# Patient Record
Sex: MALE | Race: WHITE | NOT HISPANIC OR LATINO | URBAN - METROPOLITAN AREA
[De-identification: names, ages, dates, MRNs, and addresses within clinical notes are randomized per-mention and may not be internally consistent; named-entity substitution may affect disease eponyms.]

---

## 2017-02-08 ENCOUNTER — OUTPATIENT (OUTPATIENT)
Dept: OUTPATIENT SERVICES | Facility: HOSPITAL | Age: 62
LOS: 1 days | Discharge: HOME | End: 2017-02-08

## 2017-02-08 ENCOUNTER — APPOINTMENT (OUTPATIENT)
Dept: SURGERY | Facility: CLINIC | Age: 62
End: 2017-02-08

## 2017-02-08 VITALS
HEIGHT: 74 IN | BODY MASS INDEX: 35.29 KG/M2 | DIASTOLIC BLOOD PRESSURE: 95 MMHG | TEMPERATURE: 97.7 F | WEIGHT: 275 LBS | OXYGEN SATURATION: 97 % | HEART RATE: 72 BPM | RESPIRATION RATE: 16 BRPM | SYSTOLIC BLOOD PRESSURE: 150 MMHG

## 2017-02-08 VITALS
HEIGHT: 74 IN | DIASTOLIC BLOOD PRESSURE: 87 MMHG | SYSTOLIC BLOOD PRESSURE: 144 MMHG | OXYGEN SATURATION: 99 % | RESPIRATION RATE: 18 BRPM | TEMPERATURE: 98 F | WEIGHT: 287.92 LBS

## 2017-02-08 DIAGNOSIS — Z98.89 OTHER SPECIFIED POSTPROCEDURAL STATES: Chronic | ICD-10-CM

## 2017-02-08 DIAGNOSIS — Z82.49 FAMILY HISTORY OF ISCHEMIC HEART DISEASE AND OTHER DISEASES OF THE CIRCULATORY SYSTEM: ICD-10-CM

## 2017-02-08 DIAGNOSIS — Z78.9 OTHER SPECIFIED HEALTH STATUS: ICD-10-CM

## 2017-02-08 DIAGNOSIS — K46.9 UNSPECIFIED ABDOMINAL HERNIA WITHOUT OBSTRUCTION OR GANGRENE: ICD-10-CM

## 2017-02-08 DIAGNOSIS — Z01.818 ENCOUNTER FOR OTHER PREPROCEDURAL EXAMINATION: ICD-10-CM

## 2017-02-08 LAB
ANION GAP SERPL CALC-SCNC: 8 MMOL/L — SIGNIFICANT CHANGE UP (ref 5–17)
BASOPHILS # BLD AUTO: 0 K/UL — SIGNIFICANT CHANGE UP (ref 0–0.2)
BASOPHILS NFR BLD AUTO: 0.8 % — SIGNIFICANT CHANGE UP (ref 0–2)
BUN SERPL-MCNC: 13 MG/DL — SIGNIFICANT CHANGE UP (ref 7–23)
CALCIUM SERPL-MCNC: 8.9 MG/DL — SIGNIFICANT CHANGE UP (ref 8.5–10.1)
CHLORIDE SERPL-SCNC: 107 MMOL/L — SIGNIFICANT CHANGE UP (ref 96–108)
CO2 SERPL-SCNC: 28 MMOL/L — SIGNIFICANT CHANGE UP (ref 22–31)
CREAT SERPL-MCNC: 1.06 MG/DL — SIGNIFICANT CHANGE UP (ref 0.5–1.3)
EOSINOPHIL # BLD AUTO: 0.2 K/UL — SIGNIFICANT CHANGE UP (ref 0–0.5)
EOSINOPHIL NFR BLD AUTO: 3.1 % — SIGNIFICANT CHANGE UP (ref 0–6)
GLUCOSE SERPL-MCNC: 100 MG/DL — HIGH (ref 70–99)
HCT VFR BLD CALC: 45.7 % — SIGNIFICANT CHANGE UP (ref 39–50)
HGB BLD-MCNC: 15.6 G/DL — SIGNIFICANT CHANGE UP (ref 13–17)
INR BLD: 1.1 RATIO — SIGNIFICANT CHANGE UP (ref 0.88–1.16)
LYMPHOCYTES # BLD AUTO: 1.3 K/UL — SIGNIFICANT CHANGE UP (ref 1–3.3)
LYMPHOCYTES # BLD AUTO: 21.9 % — SIGNIFICANT CHANGE UP (ref 13–44)
MCHC RBC-ENTMCNC: 29.3 PG — SIGNIFICANT CHANGE UP (ref 27–34)
MCHC RBC-ENTMCNC: 34.1 GM/DL — SIGNIFICANT CHANGE UP (ref 32–36)
MCV RBC AUTO: 85.9 FL — SIGNIFICANT CHANGE UP (ref 80–100)
MONOCYTES # BLD AUTO: 0.4 K/UL — SIGNIFICANT CHANGE UP (ref 0–0.9)
MONOCYTES NFR BLD AUTO: 7 % — SIGNIFICANT CHANGE UP (ref 2–14)
NEUTROPHILS # BLD AUTO: 3.9 K/UL — SIGNIFICANT CHANGE UP (ref 1.8–7.4)
NEUTROPHILS NFR BLD AUTO: 67.2 % — SIGNIFICANT CHANGE UP (ref 43–77)
PLATELET # BLD AUTO: 174 K/UL — SIGNIFICANT CHANGE UP (ref 150–400)
POTASSIUM SERPL-MCNC: 4 MMOL/L — SIGNIFICANT CHANGE UP (ref 3.5–5.3)
POTASSIUM SERPL-SCNC: 4 MMOL/L — SIGNIFICANT CHANGE UP (ref 3.5–5.3)
PROTHROM AB SERPL-ACNC: 12.3 SEC — SIGNIFICANT CHANGE UP (ref 10–13.1)
RBC # BLD: 5.32 M/UL — SIGNIFICANT CHANGE UP (ref 4.2–5.8)
RBC # FLD: 12.7 % — SIGNIFICANT CHANGE UP (ref 11–15)
SODIUM SERPL-SCNC: 143 MMOL/L — SIGNIFICANT CHANGE UP (ref 135–145)
WBC # BLD: 5.8 K/UL — SIGNIFICANT CHANGE UP (ref 3.8–10.5)
WBC # FLD AUTO: 5.8 K/UL — SIGNIFICANT CHANGE UP (ref 3.8–10.5)

## 2017-02-08 NOTE — H&P PST ADULT - NSANTHOSAYNRD_GEN_A_CORE
No. DELFINO screening performed.  STOP BANG Legend: 0-2 = LOW Risk; 3-4 = INTERMEDIATE Risk; 5-8 = HIGH Risk

## 2017-02-10 LAB
MRSA SPEC QL CULT: NOT DETECTED
STAPH AUREUS (SA): NOT DETECTED

## 2017-02-10 RX ORDER — CELECOXIB 200 MG/1
400 CAPSULE ORAL ONCE
Qty: 0 | Refills: 0 | Status: COMPLETED | OUTPATIENT
Start: 2017-02-13 | End: 2017-02-13

## 2017-02-10 RX ORDER — SODIUM CHLORIDE 9 MG/ML
3 INJECTION INTRAMUSCULAR; INTRAVENOUS; SUBCUTANEOUS EVERY 8 HOURS
Qty: 0 | Refills: 0 | Status: DISCONTINUED | OUTPATIENT
Start: 2017-02-13 | End: 2017-02-13

## 2017-02-12 ENCOUNTER — RESULT REVIEW (OUTPATIENT)
Age: 62
End: 2017-02-12

## 2017-02-13 ENCOUNTER — TRANSCRIPTION ENCOUNTER (OUTPATIENT)
Age: 62
End: 2017-02-13

## 2017-02-13 ENCOUNTER — APPOINTMENT (OUTPATIENT)
Dept: SURGERY | Facility: HOSPITAL | Age: 62
End: 2017-02-13

## 2017-02-13 ENCOUNTER — OUTPATIENT (OUTPATIENT)
Dept: OUTPATIENT SERVICES | Facility: HOSPITAL | Age: 62
LOS: 1 days | Discharge: HOME | End: 2017-02-13
Payer: COMMERCIAL

## 2017-02-13 VITALS
HEART RATE: 87 BPM | OXYGEN SATURATION: 97 % | TEMPERATURE: 98 F | DIASTOLIC BLOOD PRESSURE: 73 MMHG | RESPIRATION RATE: 17 BRPM | SYSTOLIC BLOOD PRESSURE: 120 MMHG

## 2017-02-13 VITALS
SYSTOLIC BLOOD PRESSURE: 121 MMHG | RESPIRATION RATE: 18 BRPM | OXYGEN SATURATION: 98 % | TEMPERATURE: 99 F | WEIGHT: 279.99 LBS | HEART RATE: 80 BPM | HEIGHT: 74 IN | DIASTOLIC BLOOD PRESSURE: 88 MMHG

## 2017-02-13 DIAGNOSIS — Z98.89 OTHER SPECIFIED POSTPROCEDURAL STATES: Chronic | ICD-10-CM

## 2017-02-13 DIAGNOSIS — M23.90 UNSPECIFIED INTERNAL DERANGEMENT OF UNSPECIFIED KNEE: Chronic | ICD-10-CM

## 2017-02-13 PROCEDURE — 88302 TISSUE EXAM BY PATHOLOGIST: CPT | Mod: 26

## 2017-02-13 PROCEDURE — 49585: CPT | Mod: AS

## 2017-02-13 PROCEDURE — 88304 TISSUE EXAM BY PATHOLOGIST: CPT | Mod: 26

## 2017-02-13 RX ORDER — ACETAMINOPHEN 500 MG
1000 TABLET ORAL ONCE
Qty: 0 | Refills: 0 | Status: COMPLETED | OUTPATIENT
Start: 2017-02-13 | End: 2017-02-13

## 2017-02-13 RX ORDER — SODIUM CHLORIDE 9 MG/ML
1000 INJECTION, SOLUTION INTRAVENOUS
Qty: 0 | Refills: 0 | Status: DISCONTINUED | OUTPATIENT
Start: 2017-02-13 | End: 2017-02-13

## 2017-02-13 RX ORDER — GABAPENTIN 400 MG/1
600 CAPSULE ORAL ONCE
Qty: 0 | Refills: 0 | Status: COMPLETED | OUTPATIENT
Start: 2017-02-13 | End: 2017-02-13

## 2017-02-13 RX ORDER — MORPHINE SULFATE 50 MG/1
4 CAPSULE, EXTENDED RELEASE ORAL
Qty: 0 | Refills: 0 | Status: DISCONTINUED | OUTPATIENT
Start: 2017-02-13 | End: 2017-02-13

## 2017-02-13 RX ORDER — ONDANSETRON 8 MG/1
4 TABLET, FILM COATED ORAL ONCE
Qty: 0 | Refills: 0 | Status: DISCONTINUED | OUTPATIENT
Start: 2017-02-13 | End: 2017-02-13

## 2017-02-13 RX ORDER — GABAPENTIN 400 MG/1
600 CAPSULE ORAL ONCE
Qty: 0 | Refills: 0 | Status: DISCONTINUED | OUTPATIENT
Start: 2017-02-13 | End: 2017-02-13

## 2017-02-13 RX ADMIN — Medication 400 MILLIGRAM(S): at 11:04

## 2017-02-13 RX ADMIN — CELECOXIB 400 MILLIGRAM(S): 200 CAPSULE ORAL at 07:33

## 2017-02-13 RX ADMIN — SODIUM CHLORIDE 3 MILLILITER(S): 9 INJECTION INTRAMUSCULAR; INTRAVENOUS; SUBCUTANEOUS at 07:14

## 2017-02-13 RX ADMIN — Medication 1000 MILLIGRAM(S): at 11:19

## 2017-02-13 RX ADMIN — SODIUM CHLORIDE 40 MILLILITER(S): 9 INJECTION, SOLUTION INTRAVENOUS at 11:05

## 2017-02-13 RX ADMIN — GABAPENTIN 600 MILLIGRAM(S): 400 CAPSULE ORAL at 07:33

## 2017-02-13 NOTE — ASU DISCHARGE PLAN (ADULT/PEDIATRIC). - MEDICATION SUMMARY - MEDICATIONS TO TAKE
I will START or STAY ON the medications listed below when I get home from the hospital:    Percocet 5/325 oral tablet  -- 2 tab(s) by mouth every 6 hours MDD:6  -- Indication: For postoperative pain

## 2017-02-13 NOTE — ASU DISCHARGE PLAN (ADULT/PEDIATRIC). - SPECIAL INSTRUCTIONS
Drink plenty of fluids to prevent constipation and fruit juices without pulp that are high in vitamin C. Rest as needed. Do not lift anything heavier than 10 pounds. You may take motrin or advil for mild pain as needed, in addition to prescribed narcotic medication. You may take over the counter stool softeners as needed. Call for any fever over 101, nausea, vomiting, severe pain, no passing of gas or bowel movement. In 48 hours, you may shower and remove pink dressing from abdomen. Please pat dry abdomen/arm. Leave the white steri strips in place; they will fall off in 5-7 days.

## 2017-02-13 NOTE — BRIEF OPERATIVE NOTE - PRE-OP DX
Lipoma of left upper extremity  02/13/2017    Active  Hailee Gudino  Umbilical hernia, incarcerated  02/13/2017    Active  Hailee Gudino

## 2017-02-13 NOTE — BRIEF OPERATIVE NOTE - PROCEDURE
Lipoma excision  02/13/2017  x2, LUE  Active  CFORNUTO  Repair of umbilical hernia with mesh  02/13/2017  incarcerated  Active  CFORNUTO

## 2017-02-13 NOTE — ASU DISCHARGE PLAN (ADULT/PEDIATRIC). - NOTIFY
Bleeding that does not stop/Pain not relieved by Medications/Swelling that continues/Fever greater than 101/Persistent Nausea and Vomiting

## 2017-02-14 LAB — SURGICAL PATHOLOGY FINAL REPORT - CH: SIGNIFICANT CHANGE UP

## 2017-02-16 DIAGNOSIS — K42.9 UMBILICAL HERNIA WITHOUT OBSTRUCTION OR GANGRENE: ICD-10-CM

## 2017-02-16 DIAGNOSIS — G47.33 OBSTRUCTIVE SLEEP APNEA (ADULT) (PEDIATRIC): ICD-10-CM

## 2017-02-16 DIAGNOSIS — D17.22 BENIGN LIPOMATOUS NEOPLASM OF SKIN AND SUBCUTANEOUS TISSUE OF LEFT ARM: ICD-10-CM

## 2017-02-22 ENCOUNTER — APPOINTMENT (OUTPATIENT)
Dept: SURGERY | Facility: CLINIC | Age: 62
End: 2017-02-22

## 2017-02-22 VITALS
HEART RATE: 90 BPM | SYSTOLIC BLOOD PRESSURE: 160 MMHG | DIASTOLIC BLOOD PRESSURE: 90 MMHG | OXYGEN SATURATION: 97 % | TEMPERATURE: 98.5 F | RESPIRATION RATE: 16 BRPM

## 2017-02-22 DIAGNOSIS — K42.9 UMBILICAL HERNIA W/OUT OBSTRUCTION OR GANGRENE: ICD-10-CM

## 2017-02-22 DIAGNOSIS — Z98.890 OTHER SPECIFIED POSTPROCEDURAL STATES: ICD-10-CM

## 2017-02-22 DIAGNOSIS — Z86.018 OTHER SPECIFIED POSTPROCEDURAL STATES: ICD-10-CM

## 2019-09-23 PROBLEM — E66.9 OBESITY, UNSPECIFIED: Chronic | Status: ACTIVE | Noted: 2017-02-08

## 2019-09-27 ENCOUNTER — APPOINTMENT (OUTPATIENT)
Dept: RHEUMATOLOGY | Facility: CLINIC | Age: 64
End: 2019-09-27

## 2022-03-03 ENCOUNTER — NON-APPOINTMENT (OUTPATIENT)
Age: 67
End: 2022-03-03

## 2022-03-03 ENCOUNTER — APPOINTMENT (OUTPATIENT)
Dept: FAMILY MEDICINE | Facility: CLINIC | Age: 67
End: 2022-03-03
Payer: MEDICARE

## 2022-03-03 VITALS
DIASTOLIC BLOOD PRESSURE: 70 MMHG | SYSTOLIC BLOOD PRESSURE: 123 MMHG | WEIGHT: 310 LBS | HEART RATE: 86 BPM | RESPIRATION RATE: 16 BRPM | BODY MASS INDEX: 39.78 KG/M2 | HEIGHT: 74 IN

## 2022-03-03 DIAGNOSIS — Z13.29 ENCOUNTER FOR SCREENING FOR OTHER SUSPECTED ENDOCRINE DISORDER: ICD-10-CM

## 2022-03-03 DIAGNOSIS — Z12.5 ENCOUNTER FOR SCREENING FOR MALIGNANT NEOPLASM OF PROSTATE: ICD-10-CM

## 2022-03-03 DIAGNOSIS — N50.89 OTHER SPECIFIED DISORDERS OF THE MALE GENITAL ORGANS: ICD-10-CM

## 2022-03-03 DIAGNOSIS — Z00.00 ENCOUNTER FOR GENERAL ADULT MEDICAL EXAMINATION W/OUT ABNORMAL FINDINGS: ICD-10-CM

## 2022-03-03 DIAGNOSIS — Z13.31 ENCOUNTER FOR SCREENING FOR DEPRESSION: ICD-10-CM

## 2022-03-03 DIAGNOSIS — Z12.11 ENCOUNTER FOR SCREENING FOR MALIGNANT NEOPLASM OF COLON: ICD-10-CM

## 2022-03-03 DIAGNOSIS — Z11.59 ENCOUNTER FOR SCREENING FOR OTHER VIRAL DISEASES: ICD-10-CM

## 2022-03-03 DIAGNOSIS — Z11.4 ENCOUNTER FOR SCREENING FOR HUMAN IMMUNODEFICIENCY VIRUS [HIV]: ICD-10-CM

## 2022-03-03 PROCEDURE — 36415 COLL VENOUS BLD VENIPUNCTURE: CPT

## 2022-03-03 PROCEDURE — G0402 INITIAL PREVENTIVE EXAM: CPT

## 2022-03-03 PROCEDURE — 99203 OFFICE O/P NEW LOW 30 MIN: CPT | Mod: 25

## 2022-03-03 PROCEDURE — G0403: CPT

## 2022-03-03 NOTE — PHYSICAL EXAM
[No Acute Distress] : no acute distress [Well Nourished] : well nourished [Well Developed] : well developed [Well-Appearing] : well-appearing [Normal Voice/Communication] : normal voice/communication [Normal Sclera/Conjunctiva] : normal sclera/conjunctiva [PERRL] : pupils equal round and reactive to light [EOMI] : extraocular movements intact [Normal Outer Ear/Nose] : the outer ears and nose were normal in appearance [Normal Oropharynx] : the oropharynx was normal [No JVD] : no jugular venous distention [No Lymphadenopathy] : no lymphadenopathy [Supple] : supple [Thyroid Normal, No Nodules] : the thyroid was normal and there were no nodules present [No Respiratory Distress] : no respiratory distress  [No Accessory Muscle Use] : no accessory muscle use [Clear to Auscultation] : lungs were clear to auscultation bilaterally [Normal Rate] : normal rate  [Regular Rhythm] : with a regular rhythm [Normal S1, S2] : normal S1 and S2 [No Murmur] : no murmur heard [No Carotid Bruits] : no carotid bruits [No Abdominal Bruit] : a ~M bruit was not heard ~T in the abdomen [No Varicosities] : no varicosities [Pedal Pulses Present] : the pedal pulses are present [No Edema] : there was no peripheral edema [No Palpable Aorta] : no palpable aorta [No Extremity Clubbing/Cyanosis] : no extremity clubbing/cyanosis [Soft] : abdomen soft [Non Tender] : non-tender [Non-distended] : non-distended [No Masses] : no abdominal mass palpated [No HSM] : no HSM [Normal Bowel Sounds] : normal bowel sounds [Urethral Meatus] : meatus normal [Urinary Bladder Findings] : the bladder was normal on palpation [Testes Tenderness] : no tenderness of the testes [Testes Mass (___cm)] : there were no testicular masses [Normal Posterior Cervical Nodes] : no posterior cervical lymphadenopathy [Normal Anterior Cervical Nodes] : no anterior cervical lymphadenopathy [No CVA Tenderness] : no CVA  tenderness [No Spinal Tenderness] : no spinal tenderness [No Joint Swelling] : no joint swelling [Grossly Normal Strength/Tone] : grossly normal strength/tone [No Rash] : no rash [Coordination Grossly Intact] : coordination grossly intact [No Focal Deficits] : no focal deficits [Normal Gait] : normal gait [Deep Tendon Reflexes (DTR)] : deep tendon reflexes were 2+ and symmetric [Speech Grossly Normal] : speech grossly normal [Normal Affect] : the affect was normal [Normal Mood] : the mood was normal [Normal Insight/Judgement] : insight and judgment were intact [FreeTextEntry1] : mild increased soft tissues in both testicles, no mass palpated. no tenderness

## 2022-03-03 NOTE — HISTORY OF PRESENT ILLNESS
[FreeTextEntry1] : establish care [de-identified] : Mr. AYLA BARONE is a pleasant 66 year old male with PMH of HLD, HTN, morbid obesity, fatty liver on RUQ Us from 12/30/2021, DELFINO,  who comes in to the office for med refill and CPE. \par He had a lung GT with hilar adenopathy, non specific on 02/24/2022. The recommendation was to repeat 3 months later\par No complaints today\par \par GI: [facility], but moved

## 2022-03-03 NOTE — HEALTH RISK ASSESSMENT
[Never] : Never [Yes] : Yes [2 - 4 times a month (2 pts)] : 2-4 times a month (2 points) [1 or 2 (0 pts)] : 1 or 2 (0 points) [Never (0 pts)] : Never (0 points) [No falls in past year] : Patient reported no falls in the past year [0] : 2) Feeling down, depressed, or hopeless: Not at all (0) [PHQ-2 Negative - No further assessment needed] : PHQ-2 Negative - No further assessment needed [Patient reported colonoscopy was normal] : Patient reported colonoscopy was normal [HIV Test offered] : HIV Test offered [Hepatitis C test offered] : Hepatitis C test offered [Audit-CScore] : 2 [BOC5Elvch] : 0 [ColonoscopyDate] : 01/2014

## 2022-03-03 NOTE — PLAN
[FreeTextEntry1] : \par In regards annual physical exam: \par Will need vaccinations records\par Ordering CBC, CMP, Hep C, HIV\par Colon cancer screen ordered: iFOBT\par Depression screen: PHQ-2 test done, < 2 as noted above\par AUDIT-C test done, negative as noted above\par Advised to see optometrist once at year and dentist every 6 months \par \par Diabetes Mellitus\par Encouraged to avoid high carbohydrates diet, to do exercise and to keep appointments\par Checking a HBA1C\par Checking a microalb/creat ratio\par Referring to podiatry\par Sees ophthalmologist Dr Boone ([place])\par Continue metformin 500 mg BID\par \par HLD, morbid obesity\par Checking lipid panel, TSH\par Continue atorvastatin 20 mg QHS\par Referring to weight medicine\par \par In regards to hypertension\par Patient's blood pressure in adequate range. \par Avoid salt\par Continue Irbesartan 150 mg QD\par Abnormal EKG reviewed: non specific  T abnormalities but no ST elevations, patient without cardiac symptoms. Referring to cardio\par \par Bilateral testicles swelling\par Asymptomatic\par Could be hydrocele or varicocele but would need to rule out other causes\par Ordering a testicular US\par Advised to wear support underwear\par Checking a UA, PSA\par \par Return to care: within 3 months for follow up or earlier if needed\par Call or return for any questions \par

## 2022-03-04 ENCOUNTER — NON-APPOINTMENT (OUTPATIENT)
Age: 67
End: 2022-03-04

## 2022-03-04 LAB
ALBUMIN SERPL ELPH-MCNC: 4.6 G/DL
ALP BLD-CCNC: 69 U/L
ALT SERPL-CCNC: 149 U/L
ANION GAP SERPL CALC-SCNC: 13 MMOL/L
AST SERPL-CCNC: 107 U/L
BASOPHILS # BLD AUTO: 0.05 K/UL
BASOPHILS NFR BLD AUTO: 0.9 %
BILIRUB SERPL-MCNC: 0.7 MG/DL
BUN SERPL-MCNC: 18 MG/DL
CALCIUM SERPL-MCNC: 9.5 MG/DL
CHLORIDE SERPL-SCNC: 99 MMOL/L
CHOLEST SERPL-MCNC: 180 MG/DL
CO2 SERPL-SCNC: 26 MMOL/L
CREAT SERPL-MCNC: 1.03 MG/DL
CREAT SPEC-SCNC: 117 MG/DL
EGFR: 81 ML/MIN/1.73M2
EOSINOPHIL # BLD AUTO: 0.12 K/UL
EOSINOPHIL NFR BLD AUTO: 2.2 %
ESTIMATED AVERAGE GLUCOSE: 249 MG/DL
GLUCOSE SERPL-MCNC: 239 MG/DL
HBA1C MFR BLD HPLC: 10.3 %
HCT VFR BLD CALC: 48.1 %
HCV AB SER QL: NONREACTIVE
HCV S/CO RATIO: 0.1 S/CO
HDLC SERPL-MCNC: 36 MG/DL
HGB BLD-MCNC: 15.2 G/DL
HIV1+2 AB SPEC QL IA.RAPID: NONREACTIVE
IMM GRANULOCYTES NFR BLD AUTO: 0.4 %
LDLC SERPL CALC-MCNC: 119 MG/DL
LYMPHOCYTES # BLD AUTO: 1.13 K/UL
LYMPHOCYTES NFR BLD AUTO: 20.5 %
MAN DIFF?: NORMAL
MCHC RBC-ENTMCNC: 28.8 PG
MCHC RBC-ENTMCNC: 31.6 GM/DL
MCV RBC AUTO: 91.1 FL
MICROALBUMIN 24H UR DL<=1MG/L-MCNC: 2.9 MG/DL
MICROALBUMIN/CREAT 24H UR-RTO: 25 MG/G
MONOCYTES # BLD AUTO: 0.48 K/UL
MONOCYTES NFR BLD AUTO: 8.7 %
NEUTROPHILS # BLD AUTO: 3.7 K/UL
NEUTROPHILS NFR BLD AUTO: 67.3 %
NONHDLC SERPL-MCNC: 144 MG/DL
PLATELET # BLD AUTO: 183 K/UL
POTASSIUM SERPL-SCNC: 4.4 MMOL/L
PROT SERPL-MCNC: 7.2 G/DL
RBC # BLD: 5.28 M/UL
RBC # FLD: 13.5 %
SODIUM SERPL-SCNC: 138 MMOL/L
TRIGL SERPL-MCNC: 123 MG/DL
TSH SERPL-ACNC: 1.98 UIU/ML
WBC # FLD AUTO: 5.5 K/UL

## 2022-03-07 LAB
ALT SERPL-CCNC: 140 U/L
APPEARANCE: ABNORMAL
AST SERPL-CCNC: 111 U/L
BACTERIA: NEGATIVE
BILIRUBIN URINE: NEGATIVE
BLOOD URINE: NEGATIVE
COLOR: YELLOW
GLUCOSE QUALITATIVE U: ABNORMAL
HAV IGM SER QL: NONREACTIVE
HBV CORE IGM SER QL: NONREACTIVE
HBV SURFACE AG SER QL: NONREACTIVE
HCV AB SER QL: NONREACTIVE
HCV S/CO RATIO: 0.1 S/CO
HYALINE CASTS: 0 /LPF
KETONES URINE: NEGATIVE
LEUKOCYTE ESTERASE URINE: NEGATIVE
MICROSCOPIC-UA: NORMAL
NITRITE URINE: NEGATIVE
PH URINE: 5
PROTEIN URINE: NEGATIVE
PSA FREE FLD-MCNC: 40 %
PSA FREE SERPL-MCNC: 0.28 NG/ML
PSA SERPL-MCNC: 0.69 NG/ML
RED BLOOD CELLS URINE: 1 /HPF
SPECIFIC GRAVITY URINE: 1.02
SQUAMOUS EPITHELIAL CELLS: 1 /HPF
URIC ACID CRYSTALS: ABNORMAL
UROBILINOGEN URINE: NORMAL
WHITE BLOOD CELLS URINE: 0 /HPF

## 2022-03-25 ENCOUNTER — APPOINTMENT (OUTPATIENT)
Dept: BARIATRICS/WEIGHT MGMT | Facility: CLINIC | Age: 67
End: 2022-03-25
Payer: MEDICARE

## 2022-03-25 VITALS — BODY MASS INDEX: 39.78 KG/M2 | WEIGHT: 310 LBS | HEIGHT: 74 IN

## 2022-03-25 PROCEDURE — 99204 OFFICE O/P NEW MOD 45 MIN: CPT | Mod: 95

## 2022-03-25 RX ORDER — IRBESARTAN 150 MG/1
150 TABLET ORAL
Refills: 0 | Status: DISCONTINUED | COMMUNITY
End: 2022-03-25

## 2022-03-25 NOTE — REASON FOR VISIT
[Initial Consultation] : an initial consultation for [FreeTextEntry2] : Here for help with wt loss, and improve health.

## 2022-03-25 NOTE — ASSESSMENT
[FreeTextEntry1] : 66 year old gentleman with  a hx of uncontrolled  dm with a a1c of 10.3 , who needs to improve his diet  interested in losing . His recent labs also revealed elevated LFT ( chronic but worse)  and likely secondary to transaminitis and fatly liver \par 1 limit red meat to once per week \par 2 stop all the whites, complex carbs only and only 2x day \par 3 no eating after 8 pm preferablly 7\par 4 exercising , with a goal of 150 min week and 60 of wts \par 5. discuss with PMD about changing meds kody the metoprolol\par 6.  meet with the nutritionist \par 80 min

## 2022-03-25 NOTE — REVIEW OF SYSTEMS
[Negative] : Psychiatric [MED-ROS-Eyes-FT] : glasses for reading  [MED-ROS-Genituro-FT] : nocturia x 3  [MED-LANIE-Gary-FT] : right knee pain secondary to need for TKA

## 2022-03-25 NOTE — HISTORY OF PRESENT ILLNESS
[Improved Health] : Improved health [Quality of Life] : Quality of life [Improved Mobility] : Improved mobility [Improved Looks/Aesthetics] : Improved looks/aesthetics [Improve Mood] : Improved mood [Decrease Medications] : Decrease medications [Improve Life Expectancy] : Improve life expectancy [Young Adult] : yound adult [Cut/Track Calories] : cut/track calories [Poor eating habits] : poor eating habits [Cravings] : cravings [Motivation] : motivation [Unsure what to eat] : unsure what to eat [Portions/overeating] : portions/overeating [Medical conditions] : medical conditions [Medications] : medications [I usually sleep 4-6 hours] : I usually sleep 4-6 hours [I snore] : I snore [Lunch] : lunch [Dinner] : dinner [Evening] : evening [Salty] : salty [Sweet] : sweet [Baked goods] : baked goods [1-2] : Dairy: 1-2 [3-5] : Meat, fish, poultry, eggs, cheese: 3-5 [Less than 1] : Sweets: less than 1 [2] : Fast food - meals per week: 2 [8] : How many cups of water do you regularly drink per day: 8 [Spouse/partner] : spouse/partner [Self] : self [Overlarge portions] : overlarge portions [Frequent Snacks] : frequent snacks [Eat Fast] : eat fast [Eat Past Satisfaction] : eat past satisfaction [Skip Meals] : skip meals [Often Go For Seconds] : often go for seconds [Boredom Eating] : boredom eating [Finish Your Plate Mentality] : finish your plate mentality [2 blocks] : Walking distance capability:2 blocks [Biking] : biking [Sports] : sports [Cardio machines: treadmill/spinning/elliptical] : cardio machines: treadmill/spinning/elliptical [0] : 0 [None] : none [T2DM] : T2DM [HTN] : HTN [DELFINO] : DELFINO [Other: ___] : [unfilled] [FreeTextEntry2] : 225 [FreeTextEntry3] : 178 [] : No [FreeTextEntry1] : 66 year old gentleman with a hx of \par 1. DM x 10 yrs\par 2 HTN\par 3 HLD\par 4 likely DELFINO \par interested in wt loss\par \par Breakfast\par gen does not like\par Honey bunches of oats\par no fruit \par \par or egg\par \par snack \par none \par \par Lunch \par tuna salad on one slice of bread 1 can with crow\par lettuce \par pickles \par \par snack \par none \par \par Dinner 6 pm \par steak flank with sauce 4 oz\par cauliflower rice\par greek salad : feta cheese and veg and dressing \par dessert: sugar free jello \par \par snack 10\par banana\par no exer\par needs a TKA on the right\par sleep  at best 5 hours, with nocturia x 3 and has an appointment for DELFINO studies \par

## 2022-03-30 ENCOUNTER — APPOINTMENT (OUTPATIENT)
Dept: FAMILY MEDICINE | Facility: CLINIC | Age: 67
End: 2022-03-30
Payer: MEDICARE

## 2022-03-30 ENCOUNTER — APPOINTMENT (OUTPATIENT)
Dept: OTOLARYNGOLOGY | Facility: CLINIC | Age: 67
End: 2022-03-30

## 2022-03-30 VITALS
RESPIRATION RATE: 16 BRPM | HEART RATE: 97 BPM | SYSTOLIC BLOOD PRESSURE: 148 MMHG | HEIGHT: 74 IN | DIASTOLIC BLOOD PRESSURE: 80 MMHG | WEIGHT: 130 LBS | BODY MASS INDEX: 16.68 KG/M2

## 2022-03-30 PROCEDURE — 36415 COLL VENOUS BLD VENIPUNCTURE: CPT

## 2022-03-30 PROCEDURE — 99214 OFFICE O/P EST MOD 30 MIN: CPT | Mod: 25

## 2022-03-30 NOTE — HISTORY OF PRESENT ILLNESS
[FreeTextEntry1] : follow up [de-identified] : Mr. AYLA BARONE is a pleasant 66 year old male with PMH of HLD, HTN, morbid obesity, fatty liver on RUQ Us from 12/30/2021, DELFINO, who comes in to the office for hospital follow up. Patient was at [facility] for dizziness on 03/15/2022, his medications were changed: his irbesartan/HCTZ was changed to metoprolol/furosemide, he was told that a coronary angio and was told that had 50% blockage, no intervention was done. Per hospital records in his portal, he had neg trops and CKMB, EKG on 03/16/2022 NSR and no abnormality, BNP and TSH was unremarkable, he was discharged.\par Patient felt dizzy the day after, he went to ENT and was told that had a viral inner ear infection. Has not had any issues since then.\par No complaints today\par \par Of note patient had transaminitis, his atorvastatin 20 mg was decreased to every other day, his hep panel was negative.\par He had a lung GT with hilar adenopathy, non specific on 02/24/2022. The recommendation was to repeat 3 months later\par \par Saw weight medicine Dr Chavarria recently.\par GI: [facility], but moved

## 2022-03-30 NOTE — PLAN
[FreeTextEntry1] : \par Diabetes Mellitus\par Encouraged to avoid high carbohydrates diet, to do exercise and to keep appointments\par HBA1C from 03/03/2022: 10.3%\par Negative  microalb/creat ratio on 03/03/2022\par Pending to see podiatry\par Sees ophthalmologist Dr Boone ([place])\par Increasing metformin (again), will take 1000 mg QAM and 500 mg QHS for 1 week and then 1000 mg BID\par \par HLD, morbid obesity\par Checking lipid panel, TSH\par Continue atorvastatin 20 mg every other night\par Seeing weight medicine\par \par Transaminitis\par ALT/AST: 149/107 on 03/04/2022, down to 142/85 on 03/15/2022 per hospital records, asymptomatic. Was advised to avoid alcohol, Tylenol, self medicating.  \par Repeat ALT/AST\par On atorvastatin now down to every other day. \par Hep panel from 03/07/2022: negative\par If repeat labs still abnormal would do an abd US, decrease/stop atorvastatin\par \par In regards to hypertension\par Patient's blood pressure in adequate range. \par Avoid salt\par Stop metoprolol and Furosemide\par Restarting his previous regimen: Irbesartan/HCTZ  150/12.5 mg QD for kidney protection.\par Abnormal EKG reviewed: non specific  T abnormalities but no ST elevations, patient without cardiac symptoms. \par WIll see cardio Dr Izquierdo on 04/01/2022\par \par Bilateral testicles swelling\par Asymptomatic\par I told him before that could be hydrocele or varicocele but would need to rule out other causes\par Still pending to do a testicular US\par Advised to wear support underwear\par Unremarkable UA, PSA\par \par Return to care: within 1 week for blood pressure follow up then in 3 months for follow up in medical conditions or earlier if needed\par Call or return for any questions \par

## 2022-03-31 LAB
ALT SERPL-CCNC: 136 U/L
AST SERPL-CCNC: 108 U/L
URATE SERPL-MCNC: 4.7 MG/DL

## 2022-03-31 RX ORDER — ATORVASTATIN CALCIUM 20 MG/1
20 TABLET, FILM COATED ORAL
Refills: 0 | Status: DISCONTINUED | COMMUNITY
End: 2022-03-31

## 2022-04-01 ENCOUNTER — NON-APPOINTMENT (OUTPATIENT)
Age: 67
End: 2022-04-01

## 2022-04-01 ENCOUNTER — APPOINTMENT (OUTPATIENT)
Dept: CARDIOLOGY | Facility: CLINIC | Age: 67
End: 2022-04-01
Payer: MEDICARE

## 2022-04-01 VITALS
HEIGHT: 74 IN | SYSTOLIC BLOOD PRESSURE: 144 MMHG | RESPIRATION RATE: 16 BRPM | HEART RATE: 82 BPM | DIASTOLIC BLOOD PRESSURE: 90 MMHG | OXYGEN SATURATION: 96 % | BODY MASS INDEX: 40.04 KG/M2 | WEIGHT: 312 LBS

## 2022-04-01 DIAGNOSIS — Z86.69 PERSONAL HISTORY OF OTHER DISEASES OF THE NERVOUS SYSTEM AND SENSE ORGANS: ICD-10-CM

## 2022-04-01 DIAGNOSIS — R94.31 ABNORMAL ELECTROCARDIOGRAM [ECG] [EKG]: ICD-10-CM

## 2022-04-01 DIAGNOSIS — R42 DIZZINESS AND GIDDINESS: ICD-10-CM

## 2022-04-01 DIAGNOSIS — Z82.49 FAMILY HISTORY OF ISCHEMIC HEART DISEASE AND OTHER DISEASES OF THE CIRCULATORY SYSTEM: ICD-10-CM

## 2022-04-01 DIAGNOSIS — Z86.39 PERSONAL HISTORY OF OTHER ENDOCRINE, NUTRITIONAL AND METABOLIC DISEASE: ICD-10-CM

## 2022-04-01 PROCEDURE — 93000 ELECTROCARDIOGRAM COMPLETE: CPT

## 2022-04-01 PROCEDURE — 99204 OFFICE O/P NEW MOD 45 MIN: CPT

## 2022-04-01 NOTE — HISTORY OF PRESENT ILLNESS
[FreeTextEntry1] : PAtient is a 65yo M with poorly controlled DM, HTN, HLD, DELFINO (unable to tolerate CPAP), obesity here for cardiac evaluation. Recently in hospital and had cath with non-obstructive CAD. Was in [facility] for dizziness, meds changed and had cath and told 50% blockage (will need to obtain records).  Has also been taken off statin due to transaminitis. Was feeling very dizzy after going out to dinner, went to hospital and vomited several times in the ambulance and in ED. Dizziness improved, then another episode at home that resolved after hospitalization. Slowly continued to improve and diagnosed with viral otitis media leading to symptoms. \par \par Patient has been doing well without any chest pain or shortness of breath. Patient denies PND/orthopnea/edema/palpitations/syncope/claudication. \par \par \par Works in construction\par \par \par \par ROS: GI negative, all others negative

## 2022-04-01 NOTE — DISCUSSION/SUMMARY
[FreeTextEntry1] : PAtient is a 65yo M with poorly controlled DM, HTN, HLD, DELFINO (unable to tolerate CPAP), fatty liver, mild non-obstructive CAD, obesity here for cardiac evaluation\par -Recent cath with 50% distal LAD and diagonal disease. MEd management. No symptoms either\par -Echo with mild LVH, needs BP control, exercise and weight loss\par -Transaminitis maybe from fatty liver. REcheck with Dr Craig then recommend restarting statin\par \par \par 1. As above restart statin as long as stable LFTs , to be checked again with DR Craig  \par 2. Diabetes management per PMD. Counselled on diet/weight loss \par 3. Recommend aggressive diet and lifestyle modifications\par 4. Recommend 30 minutes moderate intensity aerobic activity 5 days per week \par 5. PAtient has not started BP med yet, will start then re-evaluate BP as above goal now\par 6. Continue medical management of CAD (non-obstructive)\par 7. Will arrange carotid duplex due to dizziness, DM, HTN, obesity, HLD\par 8. Call with test results, otherwise follow up 6 months

## 2022-04-01 NOTE — ASSESSMENT
[FreeTextEntry1] : ECG: SR, NSST, low voltage\par \par  HDL 36   (3/2022)\par A1C  10.3\par \par ECHO 3/2022 ([facility]) :\par 1. Mild LVH, EF 60-65%\par 2. Normal RV/LA/RA \par 3. No clinically significant valvular disease  \par \par LHC 3/2022([facility])\par 1. Mild diffuse disease\par 2. 50% distal LAD stenosis and 50% diagonal stenosis\par

## 2022-04-11 ENCOUNTER — APPOINTMENT (OUTPATIENT)
Dept: BARIATRICS/WEIGHT MGMT | Facility: CLINIC | Age: 67
End: 2022-04-11
Payer: MEDICARE

## 2022-04-11 VITALS — BODY MASS INDEX: 39.8 KG/M2 | WEIGHT: 310 LBS

## 2022-04-11 PROCEDURE — 99213 OFFICE O/P EST LOW 20 MIN: CPT | Mod: 95

## 2022-04-11 NOTE — ASSESSMENT
[FreeTextEntry1] : 66 year old gentleman with  a hx of uncontrolled  dm with a a1c of 10.3 , who needs to improve his diet  interested in losing . His recent labs also revealed elevated LFT ( chronic but worse)  and likely secondary to transaminitis and fatly liver \par 1 limit red meat to once per week has not been eating in general, once this week \par 2 stop all the whites, complex carbs only and only 2x day \par 3 no eating after 8 pm preferably 7, and dinner is usually 5 pm \par 4 exercising , with a goal of 150 min week and 60 of wts : to go back to the gym this week \par 5. discuss with PMD about changing meds kody the metoprolol to coreg \par 6.  meet with the nutritionist  this week \par 7 consider glp no family hx of thyroid malignancies \par 8 hunger is controlled at this time \par 9 glu in the am are for the first time < 200 to keep working at it. \par 20

## 2022-04-11 NOTE — REASON FOR VISIT
[Home] : at home, [unfilled] , at the time of the visit. [Other Location: e.g. Home (Enter Location, City,State)___] : at [unfilled] [Verbal consent obtained from patient] : the patient, [unfilled] [Follow-Up Visit] : a follow-up visit for [FreeTextEntry2] : here for follow up for wt management

## 2022-04-11 NOTE — HISTORY OF PRESENT ILLNESS
[FreeTextEntry1] : here for follow up, glucose is coming down, had a wedding this past weekend for his son and did not eat well. \par \par Breakfast\par egg with turkey breast on 647 bread x 2 \par \par snack \par none \par \par Lunch \par salad with chicken and veg and Asian dressing \par \par \par snack \par apple or banana usu apple \par \par Dinner \par chicken cutlet in air frier , min to no breading \par broccoli \par dessert: jello sugar free\par \par

## 2022-04-14 ENCOUNTER — APPOINTMENT (OUTPATIENT)
Dept: CARDIOLOGY | Facility: CLINIC | Age: 67
End: 2022-04-14
Payer: MEDICARE

## 2022-04-14 PROCEDURE — 93880 EXTRACRANIAL BILAT STUDY: CPT

## 2022-04-15 ENCOUNTER — APPOINTMENT (OUTPATIENT)
Dept: SURGERY | Facility: CLINIC | Age: 67
End: 2022-04-15
Payer: MEDICARE

## 2022-04-15 VITALS
HEIGHT: 74 IN | HEART RATE: 94 BPM | TEMPERATURE: 97.2 F | OXYGEN SATURATION: 95 % | RESPIRATION RATE: 16 BRPM | DIASTOLIC BLOOD PRESSURE: 79 MMHG | SYSTOLIC BLOOD PRESSURE: 143 MMHG | WEIGHT: 303.38 LBS | BODY MASS INDEX: 38.93 KG/M2

## 2022-04-15 DIAGNOSIS — Z71.3 DIETARY COUNSELING AND SURVEILLANCE: ICD-10-CM

## 2022-04-15 PROCEDURE — 99213 OFFICE O/P EST LOW 20 MIN: CPT

## 2022-04-25 PROBLEM — Z71.3 DIETARY COUNSELING AND SURVEILLANCE: Status: ACTIVE | Noted: 2022-04-25

## 2022-04-25 NOTE — HISTORY OF PRESENT ILLNESS
[de-identified] : Mr. AYLA BARONE is a 66 year old with history of morbid obesity, fatty liver, HLD, and DM. Seen by Dr. Chavarria, obesity medicine & here today for nutritional counseling and evaluation. Feels well, denies pain, fever, chills, nausea or vomiting.\par

## 2022-04-25 NOTE — ASSESSMENT
[FreeTextEntry1] : Pt seen for nutrition consultation following obesity medicine appointment. Pt is a 66 year old M, whose current wt is 303#, which is above IBW range and BMI is indicative of morbid obese wt status (BMI: 39.0). Pt meets criteria for morbid obesity due to BMI >35 and diagnosis of DM. Pt presents with wt loss of -9# x 2 weeks.\par \par Breakfast: 1 egg, turkey farah, pepper\par Lunch: Salad, grilled chicken or tuna salad with 647 bread\par Snack: apple\par Dinner: chickpea pasta, air-baez chicken or salad\par Fluids: Tea, Hot Chocolate, Water, Diet Soda\par Alcohol: None\par Exercise: None\par \par Pt reports old habits as large portion sizes and eating past the point of satisfied as obstacles to weight control. Pt admits to eating fast, skipping meals, and poor food choices. Pt states he has made some changes to his eating habits since seeing Dr. Chavarria. Pt reports cutting down on his intake of red meat and avoiding snacking. Discussed the importance of a balanced, healthy diet of nourishing foods and consistent meal pattern for long-term wt loss success and health maintenance. Pt provided with information on purchasing appropriate meal replacement products and to focus on high quality protein/carbohydrate sources. Encouraged patient to eat slowly, listen to hunger and satiety cues, plan consistent meals with a protein source for hunger regulation, and to make food choices based on what will best nourish his body. Detailed information was provided on portion control. Pt provided with resources to facilitate self-care habits, such as food logging and physical activity. Also discussed the need to eat in response to physical hunger only. Pt educated on cutting out caloric beverages sabotaging wt loss as caloric fluids provide little satiation and empty calories. Pt verbalized understanding. Pt states He will cut out caloric drinks and increase water intake to facilitate adequate hydration as well as go to the gym 3x per week.\par \par NUTRITION GOALS:  \par -Eliminate caloric beverages\par -Consume consistent protein containing meals  \par -Work to improve quality of food choice to maximize nourishment\par -Limit snacks outside of physical hunger  \par -Increase physical activity to 150 mins per week\par -Not eat after 8pm\par \par Pt to follow up in 6-8 weeks. RD to remain available for ongoing support and encouragement.

## 2022-05-09 ENCOUNTER — APPOINTMENT (OUTPATIENT)
Dept: BARIATRICS/WEIGHT MGMT | Facility: CLINIC | Age: 67
End: 2022-05-09

## 2022-05-09 ENCOUNTER — APPOINTMENT (OUTPATIENT)
Dept: BARIATRICS/WEIGHT MGMT | Facility: CLINIC | Age: 67
End: 2022-05-09
Payer: MEDICARE

## 2022-05-09 VITALS — WEIGHT: 302 LBS | BODY MASS INDEX: 38.77 KG/M2

## 2022-05-09 DIAGNOSIS — E66.9 OBESITY, UNSPECIFIED: ICD-10-CM

## 2022-05-09 DIAGNOSIS — I25.10 ATHEROSCLEROTIC HEART DISEASE OF NATIVE CORONARY ARTERY W/OUT ANGINA PECTORIS: ICD-10-CM

## 2022-05-09 PROCEDURE — 99213 OFFICE O/P EST LOW 20 MIN: CPT | Mod: 95

## 2022-05-09 NOTE — HISTORY OF PRESENT ILLNESS
[FreeTextEntry1] : \par Breakfast: \par protein shake \par or an egg \par \par \par Lunch \par 647 bread, chicken breast \par apple or banana \par \par \par dinner 5-6 \par filet of sole with crab meat  Sunday \par or \par Rotisserie chicken with broccoli and Rizzotto \par dessert: watermelon  \par \par snack\par kind bar \par watermelon \par all prior to 9 \par \par \par Bed about 11 pm \par \par exercise : walking at work construction:

## 2022-05-09 NOTE — ASSESSMENT
[FreeTextEntry1] : 66 year old gentleman with  a hx of uncontrolled  dm with a a1c of 10.3 , who needs to improve his diet  interested in losing . His recent labs also revealed elevated LFT ( chronic but worse)  and likely secondary to transaminitis and fatly liver \par 1 limit red meat to once per week has not been eating in general, once this week , and has cut down \par 2 stop all the whites, complex carbs only and only 2x day , using 647 bread, needs to have brown \par eating a  protein pasta\par 3 no eating after 8 pm preferably 7, and dinner is usually 5 pm , to try not to have a snack \par 4 exercising , with a goal of 150 min week and 60 of wts : to go back to the gym \par 5. discuss with PMD about changing meds kody the metoprolol to coreg \par 6.  met with the nutritionist  this week \par 7 consider glp no family hx of thyroid malignancies \par 8 hunger is controlled at this time \par 9 glu in the am are for the first time < 200 to keep working at it. Was 178 , only  consider egg cup to hold him  better,\par 10 to prescribe glp to improve  his glucose  levels. \par 11 having a lung bx for nodules this week\par 20

## 2022-05-09 NOTE — REASON FOR VISIT
[Home] : at home, [unfilled] , at the time of the visit. [Other Location: e.g. Home (Enter Location, City,State)___] : at [unfilled] [Verbal consent obtained from patient] : the patient, [unfilled] [Follow-Up Visit] : a follow-up visit for [Obesity] : obesity [FreeTextEntry2] : here for follow up for wt management

## 2022-05-27 ENCOUNTER — APPOINTMENT (OUTPATIENT)
Dept: SURGERY | Facility: CLINIC | Age: 67
End: 2022-05-27

## 2022-05-27 ENCOUNTER — APPOINTMENT (OUTPATIENT)
Dept: BARIATRICS | Facility: CLINIC | Age: 67
End: 2022-05-27

## 2022-06-15 ENCOUNTER — APPOINTMENT (OUTPATIENT)
Dept: BARIATRICS/WEIGHT MGMT | Facility: CLINIC | Age: 67
End: 2022-06-15

## 2022-06-16 ENCOUNTER — APPOINTMENT (OUTPATIENT)
Dept: FAMILY MEDICINE | Facility: CLINIC | Age: 67
End: 2022-06-16
Payer: MEDICARE

## 2022-06-16 VITALS
WEIGHT: 302 LBS | BODY MASS INDEX: 38.76 KG/M2 | HEART RATE: 89 BPM | SYSTOLIC BLOOD PRESSURE: 179 MMHG | RESPIRATION RATE: 15 BRPM | DIASTOLIC BLOOD PRESSURE: 96 MMHG | HEIGHT: 74 IN

## 2022-06-16 DIAGNOSIS — E66.01 MORBID (SEVERE) OBESITY DUE TO EXCESS CALORIES: ICD-10-CM

## 2022-06-16 PROCEDURE — 36415 COLL VENOUS BLD VENIPUNCTURE: CPT

## 2022-06-16 PROCEDURE — 99215 OFFICE O/P EST HI 40 MIN: CPT | Mod: 25

## 2022-06-16 RX ORDER — METOPROLOL TARTRATE 25 MG/1
25 TABLET, FILM COATED ORAL
Qty: 60 | Refills: 0 | Status: DISCONTINUED | COMMUNITY
Start: 2022-03-16 | End: 2022-06-16

## 2022-06-16 NOTE — HISTORY OF PRESENT ILLNESS
[FreeTextEntry1] : follow up [de-identified] : Mr. AYLA BARONE is a pleasant 66 year old male with PMH of HLD, HTN, morbid obesity, fatty liver on RUQ Us from 12/30/2021, DELFINO, who comes in to the office for follow up in his medical conditions. His statins were stopped due to transaminitis\par Weight medicine Dr Chavarria prescribed him Ozempic injectable but was not covered by his insurance.\par \par He stopped taking his anti HTN as he thought that did not need them anymore\par \par HIs testicular swelling is resolved.\par Of note patient had transaminitis, his atorvastatin 20 mg was stopped, his hep panel was negative.\par He had a lung CT with hilar adenopathy, non specific on 02/24/2022. The recommendation was to repeat 3 months later.\par \par Saw weight medicine Dr Chavarria recently.\par GI: [facility], but moved

## 2022-06-16 NOTE — PLAN
[FreeTextEntry1] : \par Diabetes Mellitus\par HBA1C from 03/03/2022: 10.3%. Repeat today\par Negative  microalb/creat ratio on 03/03/2022\par Was previously referred to podiatry\par Sees ophthalmologist Dr Boone ([place]), will need records\par Continue metformin 1000 mg BID\par Ozempic injectable was not covered. Will send Oral (Rybelsus). Start 3 mg QD for 30 days then increase to 7 mg QD\par Referring to podiatry\par \par HLD, morbid obesity\par Repeat lipid panel, last not in target but patient did not tolerate statins due to transaminitis\par Seeing weight medicine\par \par Transaminitis\par ALT/AST: 142/85 on 03/15/2022 per hospital records, down to 136/108 on 03/31/2022, asymptomatic. Advised to avoid alcohol, Tylenol, self medicating. Atorvastatin was stopped\par Repeat ALT/AST today\par Hep panel from 03/07/2022: negative\par Abd US from dec/2021: fatty liver\par \par Hypertension\par Patient's blood pressure elevated today, has not been taking his anti HTN\par Avoid salt\par Restart Irbesartan/HCTZ  150/12.5 mg QD\par Pending cardiac work up with Dr Izquierdo for abnormal EKG, advised to follow up with him\par \par Abnormal Chest CT\par Patient had a lung CT with hilar adenopathy, non specific on 02/24/2022. The recommendation was to repeat 3 months later. Ordering repeat Chest CT\par \par Bilateral testicles swelling now resolved\par Asymptomatic\par Was advised that could be hydrocele or varicocele but would need to rule out other causes\par Still pending to do a testicular US\par Was advised to wear support underwear\par Unremarkable UA, PSA on march/2022\par \par Additional time spent outside of H&P in [place] reviewing information including but not limited to previous notes and laboratories for this patient \par Return to care: within 2 weeks for blood pressure follow up or earlier if needed\par Call or return for any questions \par

## 2022-06-17 LAB
ALBUMIN SERPL ELPH-MCNC: 4.3 G/DL
ALP BLD-CCNC: 68 U/L
ALT SERPL-CCNC: 160 U/L
ANION GAP SERPL CALC-SCNC: 17 MMOL/L
AST SERPL-CCNC: 120 U/L
BILIRUB SERPL-MCNC: 0.4 MG/DL
BUN SERPL-MCNC: 18 MG/DL
CALCIUM SERPL-MCNC: 9.6 MG/DL
CHLORIDE SERPL-SCNC: 100 MMOL/L
CHOLEST SERPL-MCNC: 236 MG/DL
CO2 SERPL-SCNC: 24 MMOL/L
CREAT SERPL-MCNC: 1.07 MG/DL
EGFR: 77 ML/MIN/1.73M2
ESTIMATED AVERAGE GLUCOSE: 200 MG/DL
GLUCOSE SERPL-MCNC: 272 MG/DL
HBA1C MFR BLD HPLC: 8.6 %
HDLC SERPL-MCNC: 30 MG/DL
LDLC SERPL CALC-MCNC: NORMAL MG/DL
NONHDLC SERPL-MCNC: 207 MG/DL
POTASSIUM SERPL-SCNC: 4.3 MMOL/L
PROT SERPL-MCNC: 7.1 G/DL
SODIUM SERPL-SCNC: 141 MMOL/L
TRIGL SERPL-MCNC: 577 MG/DL

## 2022-06-22 ENCOUNTER — NON-APPOINTMENT (OUTPATIENT)
Age: 67
End: 2022-06-22

## 2022-06-29 ENCOUNTER — APPOINTMENT (OUTPATIENT)
Dept: FAMILY MEDICINE | Facility: CLINIC | Age: 67
End: 2022-06-29

## 2022-06-29 VITALS
BODY MASS INDEX: 38.76 KG/M2 | WEIGHT: 302 LBS | SYSTOLIC BLOOD PRESSURE: 148 MMHG | HEIGHT: 74 IN | RESPIRATION RATE: 16 BRPM | DIASTOLIC BLOOD PRESSURE: 80 MMHG | HEART RATE: 93 BPM

## 2022-06-29 DIAGNOSIS — Z71.2 PERSON CONSULTING FOR EXPLANATION OF EXAMINATION OR TEST FINDINGS: ICD-10-CM

## 2022-06-29 PROCEDURE — 99214 OFFICE O/P EST MOD 30 MIN: CPT

## 2022-06-29 NOTE — PLAN
[FreeTextEntry1] : \par Diabetes Mellitus\par HBA1C from 03/03/2022: 10.3%. Repeat 2 weeks ago down to 8.6%. Repeat within 3 months\par Negative  microalb/creat ratio on 03/03/2022\par Was previously referred to podiatry\par Sees ophthalmologist Dr Boone ([place]), will need records\par Continue metformin 1000 mg BID\par On oral Rybelsus. Continue 3 mg QD for total 30 days then increase to 7 mg QD, prescription sent\par \par HLD, morbid obesity\par Non fasting lipids not in target but can't take statins due to transaminitis. Was recently referred to endocrinology\par Seeing weight medicine\par Need repeat fasting\par \par Transaminitis\par ALT/AST: 120/160  not improving. Was taking tylenol recently. Advised to avoid alcohol, Tylenol, self medicating. Has fatty liver on abd US from dec/2021. Was recently referred to GI\par Referring to hepatology\par \par Hypertension\par Patient's blood pressure now in target\par Avoid salt\par Continue  Irbesartan/HCTZ  150/12.5 mg QD\par Sees cardio work up with Dr Izquierdo for abnormal EKG but will probably change cardiologist.\par \par Abnormal Chest CT\par Patient had a lung CT with hilar adenopathy, non specific on 02/24/2022. The recommendation was to repeat 3 months later. Repeat Chest CT was ordered 3 months ago\par \par Return to care: within 3 months for follow up or earlier if needed\par Call or return for any questions \par

## 2022-06-29 NOTE — HISTORY OF PRESENT ILLNESS
[FreeTextEntry1] : follow up [de-identified] : Mr. AYLA BARONE is a pleasant 66 year old male with PMH of HLD, HTN, morbid obesity, fatty liver on RUQ Us from 12/30/2021, DELFINO, who comes in to the office for follow up in his bloOd pressure. I restarted his Irbesartan/HCTZ 2 weeks ago as he was not taking them. His statins were stopped due to transaminitis\par Weight medicine Dr Chavarria prescribed him Ozempic injectable but was not covered by his insurance. I sent oral Rybelsus which he just started. His glucoses are now in the 140s\par \par He stopped taking his anti HTN as he thought that did not need them anymore\par \par HIs testicular swelling is resolved.\par Of note patient had transaminitis, his atorvastatin 20 mg was stopped, his hep panel was negative.\par He had a lung CT with hilar adenopathy, non specific on 02/24/2022. The recommendation was to repeat 3 months later. I ordered it 2 weeks ago.\par \par Saw weight medicine Dr Chavarria recently.\par GI: [facility], but moved

## 2022-07-11 ENCOUNTER — APPOINTMENT (OUTPATIENT)
Dept: OTOLARYNGOLOGY | Facility: CLINIC | Age: 67
End: 2022-07-11

## 2022-09-22 ENCOUNTER — APPOINTMENT (OUTPATIENT)
Dept: CARDIOLOGY | Facility: CLINIC | Age: 67
End: 2022-09-22

## 2022-12-20 ENCOUNTER — APPOINTMENT (OUTPATIENT)
Dept: FAMILY MEDICINE | Facility: CLINIC | Age: 67
End: 2022-12-20

## 2022-12-20 ENCOUNTER — NON-APPOINTMENT (OUTPATIENT)
Age: 67
End: 2022-12-20

## 2022-12-20 VITALS
HEIGHT: 74 IN | DIASTOLIC BLOOD PRESSURE: 74 MMHG | HEART RATE: 89 BPM | WEIGHT: 299 LBS | RESPIRATION RATE: 16 BRPM | SYSTOLIC BLOOD PRESSURE: 120 MMHG | BODY MASS INDEX: 38.37 KG/M2

## 2022-12-20 PROCEDURE — 36415 COLL VENOUS BLD VENIPUNCTURE: CPT

## 2022-12-20 PROCEDURE — 99214 OFFICE O/P EST MOD 30 MIN: CPT | Mod: 25

## 2022-12-20 RX ORDER — ORAL SEMAGLUTIDE 7 MG/1
7 TABLET ORAL
Qty: 90 | Refills: 0 | Status: DISCONTINUED | COMMUNITY
Start: 2022-06-16 | End: 2022-12-20

## 2022-12-20 NOTE — PLAN
[FreeTextEntry1] : \par Diabetes Mellitus\par HBA1C from 06/2022: 8.6%. Repeat today and within 3 months\par Negative  microalb/creat ratio on 03/03/2022\par Was previously referred to podiatry\par Sees ophthalmologist Dr Boone ([place]), will need records\par Continue metformin 1000 mg BID\par Pending to start Ozempic (was on Oral Rybelsus before)\par \par HLD, morbid obesity\par Non fasting lipids not in target but can't take statins due to transaminitis. Was recently referred to endocrinology\par Not seeing weight medicine anymore\par Advised to stop statins due to transaminitis!, may consider Repatha. He will ask his cardiologist\par Need repeat fasting\par \par Transaminitis\par Per lab work from jun/2022:ALT/AST: 120/160  not improving. Was taking tylenol recently. Advised to avoid alcohol, Tylenol, self medicating. Has fatty liver on abd US from dec/2021. Was already referred to GI\par Referring to hepatology, pending to schedule appointments\par Advised again to hold off statins\par \par Hypertension\par Patient's blood pressure now in target\par Avoid salt\par Continue Irbesartan/HCTZ  150/12.5 mg QD\par Continue Metoprolol 25 mg ER QD\par Continue Furosemide 20 mg QD \par Sees cardio  Dr Lynn.\par \par Abnormal Chest CT\par Patient had a lung CT with hilar adenopathy, non specific on 02/24/2022. The recommendation was to repeat 3 months later. Repeat Chest CT was ordered already but he did not do it. Reordering it now\par \par Return to care: within 3 months for follow up or earlier if needed\par Call or return for any questions \par

## 2022-12-20 NOTE — HISTORY OF PRESENT ILLNESS
[FreeTextEntry1] : follow up [de-identified] : Mr. AYLA BARONE is a pleasant 66 year old male with PMH of HLD, HTN, morbid obesity, fatty liver on RUQ Us from 12/30/2021, DELFINO, who comes in to the office for follow up in his blood pressure. He is taking his Irbesartan/HCTZ 2. He has been taking metoprolol and Furosemide from his previous PCP. His statins were stopped due to transaminitis\par Cardiology prescribed him Ozempic injectable which he will start soon.\par \par \par Of note patient had transaminitis, his atorvastatin 20 mg was stopped, his hep panel was negative.\par He had a lung CT with hilar adenopathy, non specific on 02/24/2022. The recommendation was to repeat 3 months later. I ordered it 2 weeks ago.\par \par Saw weight medicine Dr Chavarria recently.\par GI: [facility], but moved

## 2022-12-21 LAB
ALBUMIN SERPL ELPH-MCNC: 4.4 G/DL
ALP BLD-CCNC: 76 U/L
ALT SERPL-CCNC: 273 U/L
ANION GAP SERPL CALC-SCNC: 16 MMOL/L
AST SERPL-CCNC: 208 U/L
BILIRUB SERPL-MCNC: 0.5 MG/DL
BUN SERPL-MCNC: 24 MG/DL
CALCIUM SERPL-MCNC: 9.7 MG/DL
CHLORIDE SERPL-SCNC: 100 MMOL/L
CHOLEST SERPL-MCNC: 202 MG/DL
CO2 SERPL-SCNC: 24 MMOL/L
CREAT SERPL-MCNC: 1.12 MG/DL
EGFR: 72 ML/MIN/1.73M2
ESTIMATED AVERAGE GLUCOSE: 252 MG/DL
GLUCOSE SERPL-MCNC: 234 MG/DL
HBA1C MFR BLD HPLC: 10.4 %
HDLC SERPL-MCNC: 36 MG/DL
LDLC SERPL CALC-MCNC: 117 MG/DL
NONHDLC SERPL-MCNC: 166 MG/DL
POTASSIUM SERPL-SCNC: 4.9 MMOL/L
PROT SERPL-MCNC: 7.2 G/DL
SODIUM SERPL-SCNC: 140 MMOL/L
TRIGL SERPL-MCNC: 245 MG/DL

## 2023-03-13 ENCOUNTER — APPOINTMENT (OUTPATIENT)
Dept: FAMILY MEDICINE | Facility: CLINIC | Age: 68
End: 2023-03-13
Payer: MEDICARE

## 2023-03-13 VITALS
RESPIRATION RATE: 16 BRPM | WEIGHT: 300 LBS | HEART RATE: 87 BPM | HEIGHT: 74 IN | SYSTOLIC BLOOD PRESSURE: 132 MMHG | BODY MASS INDEX: 38.5 KG/M2 | DIASTOLIC BLOOD PRESSURE: 80 MMHG

## 2023-03-13 DIAGNOSIS — Z01.818 ENCOUNTER FOR OTHER PREPROCEDURAL EXAMINATION: ICD-10-CM

## 2023-03-13 LAB — HBA1C MFR BLD HPLC: 8.3

## 2023-03-13 PROCEDURE — 99214 OFFICE O/P EST MOD 30 MIN: CPT

## 2023-03-15 NOTE — ASSESSMENT
[FreeTextEntry4] : \par Mr. BARONE is a 67 year male with PMH of HLD, DM, HTN, morbid obesity, transaminitis, fatty liver on RU Us from 12/30/2021, who comes to the office for preoperative evaluation. Patient has greater than 4 METS, is a moderate perioperative risk for Major adverse cardiac event. ECG and blood work reviewed. Patient needs cardiac evaluation and if cleared and iif PT/INR/PTT and when his A1C is < 8 %, he would be medically optimized for this procedure. He has been on Ozempic recently and his glucoses are improving. He will monitor them and call back our office. I called Dr Estevez's office but I am still waiting for his call back\par \par During conversation with patient extensive medical records were reviewed including but not limited to hospital records, out patient records, laboratory data \par In addition extensive time was also spent in reviewing diagnostic studies.\par \par Avoid NSAIDs, vitamins and aspirin containing products prior to surgery\par \par Counseling included abnormal lab results, differential diagnoses, treatment options, risks and benefits, lifestyle changes, current condition, medications, and dose adjustments. \par The patient was interactive, attentive, asked questions, and verbalized understanding.\par \par \par Return to care: within 2 weeks for DM follow up or sooner should the need arise\par Call or return for any questions \par

## 2023-03-15 NOTE — ADDENDUM
[FreeTextEntry1] : 5:39 PM 03/15/2023 Per discussion over the phone with Dr Estevez, patient will have a total knee replacement and not a knee arthroscopy as patient was suggesting that he would have. I advised him that his A1C is 8.9% and it would be best to better control his A1C to avoid any issues pre or postoperatively. Dr Estevez reports that would do a surgery if the A1C is, 9 % but agrees to wait as is a total knee replacement and there is no PT/INR and PTT which I also advised him to get for this patient

## 2023-03-15 NOTE — HISTORY OF PRESENT ILLNESS
[No Pertinent Cardiac History] : no history of aortic stenosis, atrial fibrillation, coronary artery disease, recent myocardial infarction, or implantable device/pacemaker [Sleep Apnea] : sleep apnea [No Adverse Anesthesia Reaction] : no adverse anesthesia reaction in self or family member [Diabetes] : diabetes [(Patient denies any chest pain, claudication, dyspnea on exertion, orthopnea, palpitations or syncope)] : Patient denies any chest pain, claudication, dyspnea on exertion, orthopnea, palpitations or syncope [Chronic Anticoagulation] : no chronic anticoagulation [Chronic Kidney Disease] : no chronic kidney disease [FreeTextEntry1] : right knee surgery [FreeTextEntry2] : 03/22/2023 [FreeTextEntry3] : Flavio Estevez [FreeTextEntry4] : Mr. AYLA BARONE is a pleasant 67 year old male with PMH of HLD, DM, HTN, morbid obesity, transaminitis, fatty liver on RUQ Us from 12/30/2021, DELFINO, who comes in to the office for preop evaluation for knee surgery\par Patient reports that is able to walk 4 blocks or a flight of stairs without getting chest pain, palpitations s or shortness of breath. \par \par His statins were stopped due to transaminitis\par Cardiology prescribed him Ozempic injectable which he just started 1 month ago and increased the dose to 0.5 mg 2 weeks ago.\par \par Of note patient had transaminitis, his atorvastatin 20 mg was stopped, his hep panel was negative.\par He had a lung CT with hilar adenopathy, non specific on 02/24/2022. The recommendation was to repeat 3 months later. I ordered it recently, still pending to repeat.\par \par Saw weight medicine Dr Chavarria recently.\par GI: [facility], but moved \par Hepatology: [facility]

## 2023-03-15 NOTE — RESULTS/DATA
[] : results reviewed [de-identified] : From PST on march/08/2023 [de-identified] : From PST on march/08/2023 [de-identified] : A1C 8.9% From PST on march/08/2023

## 2023-03-20 ENCOUNTER — RX RENEWAL (OUTPATIENT)
Age: 68
End: 2023-03-20

## 2023-03-21 ENCOUNTER — NON-APPOINTMENT (OUTPATIENT)
Age: 68
End: 2023-03-21

## 2023-03-30 PROBLEM — Z00.00 ENCOUNTER FOR PREVENTIVE HEALTH EXAMINATION: Noted: 2023-03-30

## 2023-03-31 ENCOUNTER — APPOINTMENT (OUTPATIENT)
Dept: ORTHOPEDIC SURGERY | Facility: CLINIC | Age: 68
End: 2023-03-31
Payer: MEDICARE

## 2023-03-31 VITALS
HEIGHT: 74 IN | DIASTOLIC BLOOD PRESSURE: 72 MMHG | SYSTOLIC BLOOD PRESSURE: 113 MMHG | HEART RATE: 74 BPM | BODY MASS INDEX: 38.5 KG/M2 | WEIGHT: 300 LBS

## 2023-03-31 DIAGNOSIS — Z86.39 PERSONAL HISTORY OF OTHER ENDOCRINE, NUTRITIONAL AND METABOLIC DISEASE: ICD-10-CM

## 2023-03-31 DIAGNOSIS — M25.562 PAIN IN RIGHT KNEE: ICD-10-CM

## 2023-03-31 DIAGNOSIS — M25.561 PAIN IN RIGHT KNEE: ICD-10-CM

## 2023-03-31 DIAGNOSIS — Z86.79 PERSONAL HISTORY OF OTHER DISEASES OF THE CIRCULATORY SYSTEM: ICD-10-CM

## 2023-03-31 DIAGNOSIS — M17.0 BILATERAL PRIMARY OSTEOARTHRITIS OF KNEE: ICD-10-CM

## 2023-03-31 PROCEDURE — 99204 OFFICE O/P NEW MOD 45 MIN: CPT | Mod: 25

## 2023-03-31 PROCEDURE — 20610 DRAIN/INJ JOINT/BURSA W/O US: CPT | Mod: RT

## 2023-03-31 PROCEDURE — 73564 X-RAY EXAM KNEE 4 OR MORE: CPT | Mod: 50

## 2023-03-31 NOTE — DISCUSSION/SUMMARY
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 He was scheduled for TKR with another orthopedist which was cancelled due to the patient having an A1C over 10.  Since then it has improved to 8.7 with better glycemic control.  He will work on better controlling his diabetes in order to limit his surgical risk.  Prior to proceeding with surgery it will have to be below 8.  In the meanwhile for pain relief, The patient received an intraarticular cortisone injection in the right knee in the office today. The patient will follow up in 3 months with a new A1C value. If it is below 8, we can schedule right TKR for him.  In addition he was counseled on weight loss to reduce his perioperative risk from obesity.\par \par  A discussion was had with the patient regarding a right total knee replacement. A long discussion was had with the patient as what the total joint replacement would entail. A model was used to demonstrate the operation and to discuss bearing surfaces of the implants. The hospitalization and rehabilitation were discussed.  The use of perioperative antibiotics and DVT prophylaxis were discussed. The risks, benefits and alternatives to surgical intervention were discussed at length with the patient. Specific risks discussed included: infection, wound breakdown, numbness and damage to nerves, tendon, muscle, arteries or other blood vessels. The possibility of recurrent pain, no improvement in pain and actual worsening of the pain were also mentioned in conversation with the patient. Medical complications related to the patient's general medical health including deep vein thrombosis, pulmonary embolus, heart attack, stroke, death and other complications from anesthesia were discussed as well. The patient was told that we will take steps to minimize these risks by using sterile technique, antibiotics and DVT prophylaxis when appropriate and following the patient postoperatively in the clinic setting to monitor progress. The benefits of surgery were discussed with the patient including the potential to improve the current clinical condition through operative intervention. Alternatives to surgical intervention include continued conservative management which may yield less than optimal results in this particular patient. All questions were answered to the satisfaction of the patient. Models were used as an educational tool. We did discuss implant choices and fixation, with shared decision making with the patient.		 \par \par We discussed that the knee replacement will be done with robotic assistance to enhance accuracy and dynamic joint balancing.

## 2023-03-31 NOTE — PHYSICAL EXAM
[de-identified] : The patient appears well nourished  and in no apparent distress.  The patient is alert and oriented to person, place, and time.   Affect and mood appear normal. The head is normocephalic and atraumatic.  The eyes reveal normal sclera and extra ocular muscles are intact. The tongue is midline with no apparent lesions.  Skin shows normal turgor with no evidence of eczema or psoriasis.  No respiratory distress noted.  Sensation grossly intact.		  [de-identified] : Exam of the right knee shows a varus alignment which is almost fully correctable, 3 mm laxity of the LCL, -5 to 120 degrees of flexion measured with a goniometer. \par 5/5 motor strength bilaterally distally. Sensation intact distally.  [de-identified] : X-ray: 4 views of the right knee demonstrate bone on bone varus arthritis. \par X-ray: 4 views of the left knee demonstrate moderate varus arthritis.		\par

## 2023-03-31 NOTE — PROCEDURE
[de-identified] : Using sterile technique, 2cc of depomedrol 40mg/ml, 4cc of 1% plain lidocaine, and 2 cc 0.25% marcaine was drawn up into a sterile syringe. The right knee was then sterilely prepped with chlorhexidine. Ethyl chloride spray was used to anesthetize the skin and subQ tissue.  The depomedrol/lidocaine/marcaine mixture was then injected into the knee joint in the anterolateral position. The patient tolerated the procedure well without difficulty. The patient was given instructions on the use of ice and anti-inflammatories post injection site soreness.		\par

## 2023-03-31 NOTE — HISTORY OF PRESENT ILLNESS
[de-identified] : Mr. AYLA BARONE is a 67 year old male presenting for evaluation of longstanding bilateral knee pain right worse than left.  His knee pain is worse with all weightbearing activity including walking any distance or rising from a seated position.  He also admits to worsening stiffness of the knees.  The pain is localized to the medial and anterior aspects of bilateral knee.  In the past has been diagnosed with osteoarthritis and treated conservatively thus far.  He had injections of both gel and steroid over 3 years ago without relief. Patient has tried therapy and anti-inflammatories without relief.  Patient states he was ultimately indicated for surgery but had to cancel due to high A1c.  His A1c was 10, and now is down to 8.7 one week ago.  He is hopeful to discuss total knee decreased ability to use legs for bridging/pushing

## 2023-03-31 NOTE — ADDENDUM
[FreeTextEntry1] : This note was authored by Toribio Elizondo working as a medical scribe for Dr. Wayne Pemberton. The note was reviewed, edited, and revised by Dr. Wayne Pemberton whom is in agreement with the exam findings, imaging findings, and treatment plan. 03/31/2023

## 2023-04-04 ENCOUNTER — APPOINTMENT (OUTPATIENT)
Dept: FAMILY MEDICINE | Facility: CLINIC | Age: 68
End: 2023-04-04
Payer: MEDICARE

## 2023-04-04 ENCOUNTER — APPOINTMENT (OUTPATIENT)
Dept: ALLERGY | Facility: CLINIC | Age: 68
End: 2023-04-04

## 2023-04-04 PROCEDURE — 99441: CPT | Mod: 95

## 2023-04-27 NOTE — ASU PREOP CHECKLIST - WARM FLUIDS/WARM BLANKETS
-- DO NOT REPLY / DO NOT REPLY ALL --  -- Message is from [facility]agement [facility] [employer] ([facility]) --    General Patient Message: Patient's Mom is calling to request a call back from Nurse Ibeth Mckeon about what she can give her daughter for bloating. Possibly an antacid. Along with aother question she would like you advice on.    Caller Information       Type Contact Phone/Fax    04/27/2023 11:37 AM CDT Phone (Incoming) DARION SEYMOUR (Mother) [phone]        Alternative phone number:none    Can a detailed message be left? Yes    Message Turnaround:     Is it Working Hours? Yes - Working Hours     [place]:    Please give this turnaround time to the caller:   \"This message will be sent to [state Provider's name]. The clinical team will fulfill your request as soon as they review your message.\"                
LM to call back.  
Mom returned Jasmyn's call. Relayed message from Dr. Whitehead regarding moms concerns on bloating. Instructed mom to give the office a call back if any further questions. Mom expressed Understanding.    
no

## 2023-05-02 ENCOUNTER — APPOINTMENT (OUTPATIENT)
Dept: ALLERGY | Facility: CLINIC | Age: 68
End: 2023-05-02

## 2023-06-02 ENCOUNTER — APPOINTMENT (OUTPATIENT)
Dept: FAMILY MEDICINE | Facility: CLINIC | Age: 68
End: 2023-06-02
Payer: MEDICARE

## 2023-06-02 VITALS
HEART RATE: 91 BPM | SYSTOLIC BLOOD PRESSURE: 145 MMHG | WEIGHT: 300 LBS | BODY MASS INDEX: 38.5 KG/M2 | HEIGHT: 74 IN | DIASTOLIC BLOOD PRESSURE: 78 MMHG

## 2023-06-02 VITALS — DIASTOLIC BLOOD PRESSURE: 82 MMHG | SYSTOLIC BLOOD PRESSURE: 122 MMHG

## 2023-06-02 DIAGNOSIS — J06.9 ACUTE UPPER RESPIRATORY INFECTION, UNSPECIFIED: ICD-10-CM

## 2023-06-02 DIAGNOSIS — I10 ESSENTIAL (PRIMARY) HYPERTENSION: ICD-10-CM

## 2023-06-02 PROCEDURE — 36415 COLL VENOUS BLD VENIPUNCTURE: CPT

## 2023-06-02 PROCEDURE — 99214 OFFICE O/P EST MOD 30 MIN: CPT | Mod: 25

## 2023-06-02 RX ORDER — METFORMIN HYDROCHLORIDE 625 MG/1
TABLET ORAL
Refills: 0 | Status: DISCONTINUED | COMMUNITY
End: 2023-06-02

## 2023-06-02 RX ORDER — SEMAGLUTIDE 1.34 MG/ML
2 INJECTION, SOLUTION SUBCUTANEOUS
Qty: 1 | Refills: 0 | Status: DISCONTINUED | COMMUNITY
Start: 2022-05-09 | End: 2023-06-02

## 2023-06-02 NOTE — HISTORY OF PRESENT ILLNESS
[FreeTextEntry1] : follow up [de-identified] : Mr. AYLA BARONE is a pleasant 67 year old male with PMH of HLD, HTN, morbid obesity, fatty liver on RUQ Us from 12/30/2021, DELFINO, who comes in to the office for follow up in [facility]. He started with cough 3 days ago, cough with phlegm, went to [facility], was given benzonatate and augmenting. His cough is dry now, has a mil runny nose still. No sore throat, fever, shortness of breath. He is tolerating the oral diet\par His statins were stopped due to transaminitis\par Cardiology prescribed him Ozempic injectable which he will start soon.\par \par \par Of note patient had transaminitis, his atorvastatin 20 mg was stopped, his hep panel was negative.\par He had a lung CT with hilar adenopathy, non specific on 02/24/2022. The recommendation was to repeat 3 months later which I ordered but patient did not do\par \par Sees weight medicine Dr Chavarria\par GI: [facility], but moved

## 2023-06-02 NOTE — PLAN
[FreeTextEntry1] : \par Upper respiratory infection: \par -No signs of respiratory distress at this time. \par -Continue Augmentin for a total of 5 days, then stop. unlikely that is bacterial\par Can take Claritin 10 mg QD\par Continue Benzonatate as needed\par -I advised the patient to take acetaminophen/ibuprofen with meals and as needed for pain/fever, to take chicken soup and increase the oral hydration up to 1 gallon of fluids/day unless there is a contraindication to do so. Also to monitor for worsening symptoms including but not limited to uncontrolled fever, shortness of breath, worsening sore throat, weakness and to go to the Emergency department if the symptoms worsen. \par \par Diabetes Mellitus\par HBA1C from 03/13/2023: 8.3%. Repeat today\par Negative  microalb/creat ratio on 03/03/2022\par Was previously referred to podiatry\par Sees ophthalmologist Dr Boone ([place]), will need records\par Continue metformin 1000 mg BID\par On Ozempic 1 mg SQ Q/week\par \par HLD, morbid obesity\par Non fasting lipids not in target but can't take statins due to transaminitis. Was recently referred to endocrinology\par Seeing weight medicine\par Repeat lipids today\par \par Transaminitis\par ALT/AST: 120/160  not improving. Was taking tylenol recently. Advised to avoid alcohol, Tylenol, self medicating. Has fatty liver on abd US from dec/2021. Was recently referred to GI\par Referring to hepatology\par \par Hypertension\par Patient's blood pressure now in target\par Avoid salt\par Continue  Irbesartan/HCTZ  150/12.5 mg QD\par Sees cardio work up with Dr Izquierdo for abnormal EKG but will probably change cardiologist.\par \par Abnormal Chest CT\par Patient had a lung CT with hilar adenopathy, non specific on 02/24/2022. The recommendation was to repeat 3 months later. Repeat Chest CT was ordered previously\par \par Return to care: within 3 months for follow up or earlier if needed\par Call or return for any questions \par

## 2023-06-02 NOTE — PHYSICAL EXAM
[Normal] : normal rate, regular rhythm, normal S1 and S2 and no murmur heard [de-identified] : minimal postnasal drip, no pharyngeal/tonsilar exudates

## 2023-06-05 LAB
ALBUMIN SERPL ELPH-MCNC: 4.4 G/DL
ALP BLD-CCNC: 58 U/L
ALT SERPL-CCNC: 93 U/L
ANION GAP SERPL CALC-SCNC: 17 MMOL/L
AST SERPL-CCNC: 57 U/L
BILIRUB SERPL-MCNC: 0.6 MG/DL
BUN SERPL-MCNC: 18 MG/DL
CALCIUM SERPL-MCNC: 9.8 MG/DL
CHLORIDE SERPL-SCNC: 101 MMOL/L
CHOLEST SERPL-MCNC: 215 MG/DL
CO2 SERPL-SCNC: 24 MMOL/L
CREAT SERPL-MCNC: 1.07 MG/DL
EGFR: 77 ML/MIN/1.73M2
ESTIMATED AVERAGE GLUCOSE: 148 MG/DL
GLUCOSE SERPL-MCNC: 163 MG/DL
HBA1C MFR BLD HPLC: 6.8 %
HDLC SERPL-MCNC: 32 MG/DL
LDLC SERPL CALC-MCNC: 136 MG/DL
NONHDLC SERPL-MCNC: 183 MG/DL
POTASSIUM SERPL-SCNC: 4.3 MMOL/L
PROT SERPL-MCNC: 7.1 G/DL
SODIUM SERPL-SCNC: 142 MMOL/L
TRIGL SERPL-MCNC: 233 MG/DL

## 2023-06-05 RX ORDER — ATORVASTATIN CALCIUM 80 MG/1
TABLET, FILM COATED ORAL
Refills: 0 | Status: DISCONTINUED | COMMUNITY
End: 2023-06-05

## 2023-07-11 ENCOUNTER — APPOINTMENT (OUTPATIENT)
Dept: ALLERGY | Facility: CLINIC | Age: 68
End: 2023-07-11

## 2023-09-07 ENCOUNTER — APPOINTMENT (OUTPATIENT)
Dept: FAMILY MEDICINE | Facility: CLINIC | Age: 68
End: 2023-09-07

## 2023-11-16 ENCOUNTER — APPOINTMENT (OUTPATIENT)
Dept: OTOLARYNGOLOGY | Facility: CLINIC | Age: 68
End: 2023-11-16
Payer: MEDICARE

## 2023-11-16 VITALS
SYSTOLIC BLOOD PRESSURE: 123 MMHG | BODY MASS INDEX: 38.5 KG/M2 | WEIGHT: 300 LBS | DIASTOLIC BLOOD PRESSURE: 77 MMHG | HEART RATE: 103 BPM | HEIGHT: 74 IN

## 2023-11-16 DIAGNOSIS — G47.33 OBSTRUCTIVE SLEEP APNEA (ADULT) (PEDIATRIC): ICD-10-CM

## 2023-11-16 PROCEDURE — 99205 OFFICE O/P NEW HI 60 MIN: CPT

## 2023-11-16 RX ORDER — SEMAGLUTIDE 1.34 MG/ML
INJECTION, SOLUTION SUBCUTANEOUS
Refills: 0 | Status: DISCONTINUED | COMMUNITY
End: 2023-11-16

## 2023-11-16 RX ORDER — METOPROLOL TARTRATE 75 MG/1
TABLET, FILM COATED ORAL
Refills: 0 | Status: DISCONTINUED | COMMUNITY
End: 2023-11-16

## 2023-11-16 RX ORDER — METOPROLOL SUCCINATE 25 MG/1
25 TABLET, EXTENDED RELEASE ORAL
Qty: 90 | Refills: 0 | Status: DISCONTINUED | COMMUNITY
Start: 2022-03-25 | End: 2023-11-16

## 2023-11-17 ENCOUNTER — TRANSCRIPTION ENCOUNTER (OUTPATIENT)
Age: 68
End: 2023-11-17

## 2024-03-13 ENCOUNTER — APPOINTMENT (OUTPATIENT)
Dept: FAMILY MEDICINE | Facility: CLINIC | Age: 69
End: 2024-03-13
Payer: MEDICARE

## 2024-03-13 VITALS
WEIGHT: 300 LBS | BODY MASS INDEX: 38.5 KG/M2 | HEART RATE: 102 BPM | HEIGHT: 74 IN | RESPIRATION RATE: 16 BRPM | DIASTOLIC BLOOD PRESSURE: 66 MMHG | SYSTOLIC BLOOD PRESSURE: 106 MMHG

## 2024-03-13 DIAGNOSIS — K76.0 FATTY (CHANGE OF) LIVER, NOT ELSEWHERE CLASSIFIED: ICD-10-CM

## 2024-03-13 DIAGNOSIS — R74.01 ELEVATION OF LEVELS OF LIVER TRANSAMINASE LEVELS: ICD-10-CM

## 2024-03-13 DIAGNOSIS — E11.69 TYPE 2 DIABETES MELLITUS WITH OTHER SPECIFIED COMPLICATION: ICD-10-CM

## 2024-03-13 DIAGNOSIS — E88.9 TYPE 2 DIABETES MELLITUS WITH OTHER SPECIFIED COMPLICATION: ICD-10-CM

## 2024-03-13 PROCEDURE — G2211 COMPLEX E/M VISIT ADD ON: CPT

## 2024-03-13 PROCEDURE — 99215 OFFICE O/P EST HI 40 MIN: CPT

## 2024-03-13 NOTE — PLAN
[FreeTextEntry1] : Diabetes Mellitus Check HbA1C Negative  microalb/creat ratio on 03/03/2022 Was previously referred to podiatry Sees ophthalmologist Dr Boone ([place]), will need records Continue metformin 1000 mg BID On Farxiga 10 mg QD Continue Metformin 1000 mg BID Not on Ozempic  HLD, morbid obesity Seeing weight medicine Repeat lipids today Off statins due to transaminitis On some unknown every 3 months shots at [facility]. Will need records. Sees endocrinology.  Transaminitis and fatty liver ALT/AST: 120/160 not improving. Was taking Tylenol recently. Advised to avoid alcohol, Tylenol, self-medicating. Has fatty liver on abd US from dec/2021. Was recently referred to GI Saw referring again tp hepatology Dr Beckwith  Hypertension Patient's blood pressure now in target Avoid salt. Continue  Irbesartan/HCTZ  150-12.5 mg QD Sees cardio work up with Dr Izquierdo for abnormal EKG  Abnormal Chest CT Patient had a lung CT with hilar adenopathy, nonspecific on 02/24/2022. The recommendation was to repeat 3 months later. Repeat Chest CT was ordered previously but he did not do it. Ordering repeat  This was an extensive visit that included review of previous labs and specialists notes before and after the face-to-face encounter.  Return to care: within 3 months for follow up or earlier if needed Call or return for any questions

## 2024-03-13 NOTE — HEALTH RISK ASSESSMENT
[0] : 1) Little interest or pleasure doing things: Not at all (0) [PHQ-2 Negative - No further assessment needed] : PHQ-2 Negative - No further assessment needed [[id number]] : 0

## 2024-03-13 NOTE — HISTORY OF PRESENT ILLNESS
[de-identified] : Mr. AYLA BARONE is a pleasant 67-year-old male with PMH of HLD, HTN, morbid obesity, fatty liver on RUQ Us from 12/30/2021, DELFINO, who comes to the office to follow up in medical conditions.  His statins were stopped due to transaminitis On Mounjaro and Farxiga by cardio/endocrino  Of note patient had transaminitis, his atorvastatin 20 mg was stopped, his hep panel was negative. He had a lung CT with hilar adenopathy, non specific on 02/24/2022. The recommendation was to repeat 3 months later which I ordered but patient did not do.  Sees weight medicine Dr Chavarria GI: susan Rodriguez. Cardio: [facility] Endocrino: Prat[facility] [FreeTextEntry1] : Follow up

## 2024-03-14 ENCOUNTER — EMERGENCY (EMERGENCY)
Facility: HOSPITAL | Age: 69
LOS: 1 days | Discharge: HOME | End: 2024-03-14
Attending: STUDENT IN AN ORGANIZED HEALTH CARE EDUCATION/TRAINING PROGRAM
Payer: MEDICARE

## 2024-03-14 ENCOUNTER — NON-APPOINTMENT (OUTPATIENT)
Age: 69
End: 2024-03-14

## 2024-03-14 VITALS
OXYGEN SATURATION: 96 % | HEIGHT: 76 IN | WEIGHT: 305.12 LBS | SYSTOLIC BLOOD PRESSURE: 138 MMHG | DIASTOLIC BLOOD PRESSURE: 78 MMHG | RESPIRATION RATE: 16 BRPM | TEMPERATURE: 98 F | HEART RATE: 99 BPM

## 2024-03-14 VITALS
HEART RATE: 84 BPM | OXYGEN SATURATION: 94 % | DIASTOLIC BLOOD PRESSURE: 76 MMHG | TEMPERATURE: 98 F | RESPIRATION RATE: 18 BRPM | SYSTOLIC BLOOD PRESSURE: 121 MMHG

## 2024-03-14 DIAGNOSIS — M23.90 UNSPECIFIED INTERNAL DERANGEMENT OF UNSPECIFIED KNEE: Chronic | ICD-10-CM

## 2024-03-14 DIAGNOSIS — Z98.89 OTHER SPECIFIED POSTPROCEDURAL STATES: Chronic | ICD-10-CM

## 2024-03-14 LAB
ALBUMIN SERPL ELPH-MCNC: 3.6 G/DL — SIGNIFICANT CHANGE UP (ref 3.3–5.2)
ALBUMIN SERPL ELPH-MCNC: 4.5 G/DL
ALP BLD-CCNC: 74 U/L
ALP SERPL-CCNC: 57 U/L — SIGNIFICANT CHANGE UP (ref 40–120)
ALT FLD-CCNC: 114 U/L — HIGH
ALT SERPL-CCNC: 147 U/L
ANION GAP SERPL CALC-SCNC: 11 MMOL/L — SIGNIFICANT CHANGE UP (ref 5–17)
ANION GAP SERPL CALC-SCNC: 16 MMOL/L
AST SERPL-CCNC: 69 U/L — HIGH
AST SERPL-CCNC: 84 U/L
BASOPHILS # BLD AUTO: 0.05 K/UL — SIGNIFICANT CHANGE UP (ref 0–0.2)
BASOPHILS NFR BLD AUTO: 0.8 % — SIGNIFICANT CHANGE UP (ref 0–2)
BILIRUB SERPL-MCNC: 0.5 MG/DL — SIGNIFICANT CHANGE UP (ref 0.4–2)
BILIRUB SERPL-MCNC: 0.6 MG/DL
BUN SERPL-MCNC: 20.2 MG/DL — HIGH (ref 8–20)
BUN SERPL-MCNC: 21 MG/DL
CALCIUM SERPL-MCNC: 10.3 MG/DL
CALCIUM SERPL-MCNC: 8.2 MG/DL — LOW (ref 8.4–10.5)
CHLORIDE SERPL-SCNC: 102 MMOL/L
CHLORIDE SERPL-SCNC: 106 MMOL/L — SIGNIFICANT CHANGE UP (ref 96–108)
CHOLEST SERPL-MCNC: 129 MG/DL
CO2 SERPL-SCNC: 23 MMOL/L
CO2 SERPL-SCNC: 23 MMOL/L — SIGNIFICANT CHANGE UP (ref 22–29)
CREAT SERPL-MCNC: 1.05 MG/DL — SIGNIFICANT CHANGE UP (ref 0.5–1.3)
CREAT SERPL-MCNC: 1.41 MG/DL
EGFR: 55 ML/MIN/1.73M2
EGFR: 78 ML/MIN/1.73M2 — SIGNIFICANT CHANGE UP
EOSINOPHIL # BLD AUTO: 0.18 K/UL — SIGNIFICANT CHANGE UP (ref 0–0.5)
EOSINOPHIL NFR BLD AUTO: 2.8 % — SIGNIFICANT CHANGE UP (ref 0–6)
ESTIMATED AVERAGE GLUCOSE: 183 MG/DL
GLUCOSE SERPL-MCNC: 133 MG/DL
GLUCOSE SERPL-MCNC: 97 MG/DL — SIGNIFICANT CHANGE UP (ref 70–99)
HBA1C MFR BLD HPLC: 8 %
HCT VFR BLD CALC: 46.1 % — SIGNIFICANT CHANGE UP (ref 39–50)
HDLC SERPL-MCNC: 36 MG/DL
HGB BLD-MCNC: 15.3 G/DL — SIGNIFICANT CHANGE UP (ref 13–17)
IMM GRANULOCYTES NFR BLD AUTO: 0.3 % — SIGNIFICANT CHANGE UP (ref 0–0.9)
LDLC SERPL CALC-MCNC: 62 MG/DL
LYMPHOCYTES # BLD AUTO: 1.41 K/UL — SIGNIFICANT CHANGE UP (ref 1–3.3)
LYMPHOCYTES # BLD AUTO: 21.8 % — SIGNIFICANT CHANGE UP (ref 13–44)
MCHC RBC-ENTMCNC: 29.4 PG — SIGNIFICANT CHANGE UP (ref 27–34)
MCHC RBC-ENTMCNC: 33.2 GM/DL — SIGNIFICANT CHANGE UP (ref 32–36)
MCV RBC AUTO: 88.7 FL — SIGNIFICANT CHANGE UP (ref 80–100)
MONOCYTES # BLD AUTO: 0.65 K/UL — SIGNIFICANT CHANGE UP (ref 0–0.9)
MONOCYTES NFR BLD AUTO: 10 % — SIGNIFICANT CHANGE UP (ref 2–14)
NEUTROPHILS # BLD AUTO: 4.17 K/UL — SIGNIFICANT CHANGE UP (ref 1.8–7.4)
NEUTROPHILS NFR BLD AUTO: 64.3 % — SIGNIFICANT CHANGE UP (ref 43–77)
NONHDLC SERPL-MCNC: 93 MG/DL
PLATELET # BLD AUTO: 185 K/UL — SIGNIFICANT CHANGE UP (ref 150–400)
POTASSIUM SERPL-MCNC: 3.5 MMOL/L — SIGNIFICANT CHANGE UP (ref 3.5–5.3)
POTASSIUM SERPL-SCNC: 3.5 MMOL/L — SIGNIFICANT CHANGE UP (ref 3.5–5.3)
POTASSIUM SERPL-SCNC: 4.2 MMOL/L
PROT SERPL-MCNC: 6.2 G/DL — LOW (ref 6.6–8.7)
PROT SERPL-MCNC: 7.3 G/DL
RBC # BLD: 5.2 M/UL — SIGNIFICANT CHANGE UP (ref 4.2–5.8)
RBC # FLD: 13.3 % — SIGNIFICANT CHANGE UP (ref 10.3–14.5)
SODIUM SERPL-SCNC: 140 MMOL/L
SODIUM SERPL-SCNC: 140 MMOL/L — SIGNIFICANT CHANGE UP (ref 135–145)
TRIGL SERPL-MCNC: 185 MG/DL
TSH SERPL-ACNC: 1.68 UIU/ML
WBC # BLD: 6.48 K/UL — SIGNIFICANT CHANGE UP (ref 3.8–10.5)
WBC # FLD AUTO: 6.48 K/UL — SIGNIFICANT CHANGE UP (ref 3.8–10.5)

## 2024-03-14 PROCEDURE — 99283 EMERGENCY DEPT VISIT LOW MDM: CPT

## 2024-03-14 PROCEDURE — 85025 COMPLETE CBC W/AUTO DIFF WBC: CPT

## 2024-03-14 PROCEDURE — 80053 COMPREHEN METABOLIC PANEL: CPT

## 2024-03-14 PROCEDURE — 99284 EMERGENCY DEPT VISIT MOD MDM: CPT | Mod: GC

## 2024-03-14 PROCEDURE — 36415 COLL VENOUS BLD VENIPUNCTURE: CPT

## 2024-03-14 RX ORDER — SODIUM CHLORIDE 9 MG/ML
1000 INJECTION INTRAMUSCULAR; INTRAVENOUS; SUBCUTANEOUS ONCE
Refills: 0 | Status: COMPLETED | OUTPATIENT
Start: 2024-03-14 | End: 2024-03-14

## 2024-03-14 RX ADMIN — SODIUM CHLORIDE 1000 MILLILITER(S): 9 INJECTION INTRAMUSCULAR; INTRAVENOUS; SUBCUTANEOUS at 19:54

## 2024-03-14 NOTE — ED PROVIDER NOTE - ATTENDING CONTRIBUTION TO CARE
68y M w/ hx DM, HTN, HLD; presents for abnormal blood test. Pt says he had routine blood work done yesterday ordered by his PCP that showed elevated creatinine of 1.4 (was around 1 a month ago). Was told to go to the ED for evaluation. Pt denying any complaints. Notes that he recently was switched to Mounjaro from Ozempic around 1.5 months ago. On exam, pt well appearing and in no distress. Non-labored respirations, lungs CTAB, abdomen soft and nontender. Rpt labs showing improvement of creatinine. Pt is aware of mildly elevated LFTs (pt has known fatty liver). Medically stable for discharge with outpatient f/u with PCP.

## 2024-03-14 NOTE — ED ADULT TRIAGE NOTE - CHIEF COMPLAINT QUOTE
Pt had a routine physical yesterday and blood work. He was called today for abnormal labs. Had elevated LFT'S and renal function. Pt is aymptomatic.

## 2024-03-14 NOTE — ED PROVIDER NOTE - NSFOLLOWUPINSTRUCTIONS_ED_ALL_ED_FT
Follow up with your primary care doctor.  Take copy of test results from today for when you follow up.   Return to the emergency room for any new or worsening issues.

## 2024-03-14 NOTE — ED PROVIDER NOTE - CLINICAL SUMMARY MEDICAL DECISION MAKING FREE TEXT BOX
69yo M PMHx diabetes presents today from PCP after he was called for CAYDEN and elevated LFTs. Pt was taking metformin and jeanine, was just d/c'ed from metformin. Otherwise, pt is well appearing, in his usual state of health, and has no medical complaints at this time.     considering metformin associated lactic acidosis. will obtain cbc cmp, give IVF and reassess.

## 2024-03-14 NOTE — ED PROVIDER NOTE - OBJECTIVE STATEMENT
67yo M PMHx diabetes presents today from PCP after he was called for CAYDEN and elevated LFTs. Pt was taking metformin and munjaro, was just d/c'ed from metformin. Otherwise, pt is well appearing, in his usual state of health, and has no medical complaints at this time.   No fever/chills, No photophobia/eye pain/changes in vision, No ear pain/sore throat/dysphagia, No chest pain/palpitations, no SOB/cough/wheeze/stridor, No abdominal pain, No N/V/D, no dysuria/frequency/discharge, No neck/back pain, no rash, no changes in neurological status/function.

## 2024-03-14 NOTE — ED PROVIDER NOTE - PATIENT PORTAL LINK FT
You can access the FollowMyHealth Patient Portal offered by [facility] by registering at the following website: [email or web address]. By joining [facility]’s FollowMyHealth portal, you will also be able to view your health information using other applications (apps) compatible with our system.

## 2024-04-14 ENCOUNTER — NON-APPOINTMENT (OUTPATIENT)
Age: 69
End: 2024-04-14

## 2024-06-28 ENCOUNTER — APPOINTMENT (OUTPATIENT)
Dept: FAMILY MEDICINE | Facility: CLINIC | Age: 69
End: 2024-06-28
Payer: MEDICARE

## 2024-06-28 VITALS — SYSTOLIC BLOOD PRESSURE: 134 MMHG | DIASTOLIC BLOOD PRESSURE: 70 MMHG

## 2024-06-28 VITALS
SYSTOLIC BLOOD PRESSURE: 143 MMHG | HEIGHT: 74 IN | WEIGHT: 300 LBS | BODY MASS INDEX: 38.5 KG/M2 | DIASTOLIC BLOOD PRESSURE: 82 MMHG | HEART RATE: 94 BPM

## 2024-06-28 DIAGNOSIS — I15.2 TYPE 2 DIABETES MELLITUS WITH OTHER CIRCULATORY COMPLICATIONS: ICD-10-CM

## 2024-06-28 DIAGNOSIS — E11.59 TYPE 2 DIABETES MELLITUS WITH OTHER CIRCULATORY COMPLICATIONS: ICD-10-CM

## 2024-06-28 DIAGNOSIS — R93.89 ABNORMAL FINDINGS ON DIAGNOSTIC IMAGING OF OTHER SPECIFIED BODY STRUCTURES: ICD-10-CM

## 2024-06-28 DIAGNOSIS — E11.69 TYPE 2 DIABETES MELLITUS WITH OTHER SPECIFIED COMPLICATION: ICD-10-CM

## 2024-06-28 DIAGNOSIS — E78.5 TYPE 2 DIABETES MELLITUS WITH OTHER SPECIFIED COMPLICATION: ICD-10-CM

## 2024-06-28 LAB — HBA1C MFR BLD HPLC: 7.4

## 2024-06-28 PROCEDURE — G2211 COMPLEX E/M VISIT ADD ON: CPT

## 2024-06-28 PROCEDURE — 99214 OFFICE O/P EST MOD 30 MIN: CPT

## 2024-08-01 ENCOUNTER — APPOINTMENT (OUTPATIENT)
Dept: INTERNAL MEDICINE | Facility: CLINIC | Age: 69
End: 2024-08-01
Payer: MEDICARE

## 2024-08-01 VITALS
HEIGHT: 74 IN | BODY MASS INDEX: 38.5 KG/M2 | HEART RATE: 89 BPM | DIASTOLIC BLOOD PRESSURE: 77 MMHG | SYSTOLIC BLOOD PRESSURE: 133 MMHG | WEIGHT: 300 LBS

## 2024-08-01 DIAGNOSIS — E66.9 OBESITY, UNSPECIFIED: ICD-10-CM

## 2024-08-01 DIAGNOSIS — I15.2 TYPE 2 DIABETES MELLITUS WITH OTHER CIRCULATORY COMPLICATIONS: ICD-10-CM

## 2024-08-01 DIAGNOSIS — K40.90 UNILATERAL INGUINAL HERNIA, W/OUT OBSTRUCTION OR GANGRENE, NOT SPECIFIED AS RECURRENT: ICD-10-CM

## 2024-08-01 DIAGNOSIS — E11.59 TYPE 2 DIABETES MELLITUS WITH OTHER CIRCULATORY COMPLICATIONS: ICD-10-CM

## 2024-08-01 DIAGNOSIS — M54.16 RADICULOPATHY, LUMBAR REGION: ICD-10-CM

## 2024-08-01 PROCEDURE — 36415 COLL VENOUS BLD VENIPUNCTURE: CPT

## 2024-08-01 PROCEDURE — G2211 COMPLEX E/M VISIT ADD ON: CPT

## 2024-08-01 PROCEDURE — 99214 OFFICE O/P EST MOD 30 MIN: CPT

## 2024-08-01 NOTE — PLAN
[FreeTextEntry1] : right inguinal hernia- will obtain CT abdomen pelvis and call patient with results Patient was referred to General surgeon   HTN- patient's BP well controlled with current medication. will continue current  regimen  History of Lumbar radiculopathy- patient referred to pain management Patient denies urinary incontinence, bowel incontinence, leg weakness or saddle anesthesia   Regarding patient's obesity - encourage lifestyle modifications - diet and exercise - reduce calorie intake - no soda/ junk food/ snacks - consider mixed grains/ whole grains, leafy vegetables, and an appropriate serving of protein   Prior to appointment and during encounter with patient extensive medical records were reviewed including but not limited to, Hospital records, out patient records, laboratory data and microbiology data    Total encounter total time 30 mins >50% of time spent counseling/coordinating care  Counseling included abnormal lab results, differential diagnoses, treatment options, risks and benefits, lifestyle changes, current condition, medications, and dose adjustments.  The patient was interactive, attentive, asked questions, and verbalized understanding

## 2024-08-13 ENCOUNTER — APPOINTMENT (OUTPATIENT)
Dept: CT IMAGING | Facility: CLINIC | Age: 69
End: 2024-08-13
Payer: MEDICARE

## 2024-08-13 PROCEDURE — 74177 CT ABD & PELVIS W/CONTRAST: CPT | Mod: TC

## 2024-08-15 ENCOUNTER — APPOINTMENT (OUTPATIENT)
Dept: GASTROENTEROLOGY | Facility: CLINIC | Age: 69
End: 2024-08-15
Payer: MEDICARE

## 2024-08-15 VITALS
SYSTOLIC BLOOD PRESSURE: 125 MMHG | OXYGEN SATURATION: 97 % | RESPIRATION RATE: 16 BRPM | HEIGHT: 74 IN | WEIGHT: 300 LBS | HEART RATE: 92 BPM | BODY MASS INDEX: 38.5 KG/M2 | DIASTOLIC BLOOD PRESSURE: 70 MMHG

## 2024-08-15 DIAGNOSIS — K76.0 FATTY (CHANGE OF) LIVER, NOT ELSEWHERE CLASSIFIED: ICD-10-CM

## 2024-08-15 DIAGNOSIS — R74.01 ELEVATION OF LEVELS OF LIVER TRANSAMINASE LEVELS: ICD-10-CM

## 2024-08-15 PROCEDURE — 99215 OFFICE O/P EST HI 40 MIN: CPT

## 2024-08-16 NOTE — HISTORY OF PRESENT ILLNESS
[de-identified] : AYLA BAROEN is a 69 year old male with a PMH of DM 2, HTN, HLD and obesity.   He presents today for evaluation of elevated LFTs, referred by PCP. Records reviewed, including notes, labs, imaging, etc. LFT elevations date back to at least 2022. Labs 6/2024 - tbili 0.7, AST 65, ALT 99, AP 64. CT A/P w/IVC 8/13/24 showed hepatic steatosis. Denies abdominal pain or distension, jaundice, hematemesis, hematochezia, dark urine, confusion or unintentional weight loss. Denies significant alcohol consumption or otc/herbal supplements. Denies family history of liver disease.

## 2024-08-16 NOTE — PHYSICAL EXAM
[Non-Tender] : non-tender [General Appearance - Alert] : alert [Sclera] : the sclera and conjunctiva were normal [Edema] : there was no peripheral edema [Abdomen Soft] : soft [Bowel Sounds] : normal bowel sounds [Abdomen Tenderness] : non-tender [] : no hepato-splenomegaly [Abdomen Mass (___ Cm)] : no abdominal mass palpated [Oriented To Time, Place, And Person] : oriented to person, place, and time [Scleral Icterus] : No Scleral Icterus [Spider Angioma] : No spider angioma(s) were observed [Abdominal  Ascites] : no ascites [Asterixis] : no asterixis observed [Jaundice] : No jaundice [Palmar Erythema] : no Palmar Erythema [Depression] : no depression

## 2024-08-16 NOTE — HISTORY OF PRESENT ILLNESS
[de-identified] : AYLA BARONE is a 69 year old male with a PMH of DM 2, HTN, HLD and obesity.   He presents today for evaluation of elevated LFTs, referred by PCP. Records reviewed, including notes, labs, imaging, etc. LFT elevations date back to at least 2022. Labs 6/2024 - tbili 0.7, AST 65, ALT 99, AP 64. CT A/P w/IVC 8/13/24 showed hepatic steatosis. Denies abdominal pain or distension, jaundice, hematemesis, hematochezia, dark urine, confusion or unintentional weight loss. Denies significant alcohol consumption or otc/herbal supplements. Denies family history of liver disease.

## 2024-08-16 NOTE — ASSESSMENT
[FreeTextEntry1] : AYLA BARONE is a 69 year old male with a PMH of DM 2, HTN, HLD and obesity.  Elevated LFTs -Labs 6/2024 - tbili 0.7, AST 65, ALT 99, AP 64. CT A/P w/IVC 8/13/24 showed hepatic steatosis. 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 -Recommend lifestyle modifications in the form of diet and exercise. Gradual weight loss of 7-10% of current body weight. These changes have been shown to lead to regression or even resolution of steatosis, inflammation, and even fibrosis in some patients. -Copy of Mediterranean diet given. -If LFTs remain elevated despite lifestyle modifications, will consider Liver Biopsy.   Follow up in 1 month

## 2024-08-19 ENCOUNTER — APPOINTMENT (OUTPATIENT)
Dept: SURGERY | Facility: CLINIC | Age: 69
End: 2024-08-19
Payer: MEDICARE

## 2024-08-19 VITALS
SYSTOLIC BLOOD PRESSURE: 119 MMHG | HEIGHT: 71.5 IN | DIASTOLIC BLOOD PRESSURE: 70 MMHG | BODY MASS INDEX: 41.22 KG/M2 | TEMPERATURE: 97.7 F | OXYGEN SATURATION: 98 % | WEIGHT: 301 LBS | HEART RATE: 86 BPM | RESPIRATION RATE: 16 BRPM

## 2024-08-19 DIAGNOSIS — R10.31 RIGHT LOWER QUADRANT PAIN: ICD-10-CM

## 2024-08-19 PROCEDURE — 99205 OFFICE O/P NEW HI 60 MIN: CPT

## 2024-09-04 ENCOUNTER — RESULT REVIEW (OUTPATIENT)
Age: 69
End: 2024-09-04

## 2024-09-05 ENCOUNTER — APPOINTMENT (OUTPATIENT)
Dept: SURGERY | Facility: CLINIC | Age: 69
End: 2024-09-05

## 2024-09-09 ENCOUNTER — RESULT REVIEW (OUTPATIENT)
Age: 69
End: 2024-09-09

## 2024-09-09 ENCOUNTER — APPOINTMENT (OUTPATIENT)
Dept: MRI IMAGING | Facility: CLINIC | Age: 69
End: 2024-09-09
Payer: MEDICARE

## 2024-09-09 PROCEDURE — 74183 MRI ABD W/O CNTR FLWD CNTR: CPT | Mod: TC

## 2024-09-09 PROCEDURE — A9585: CPT | Mod: JZ

## 2024-09-09 PROCEDURE — 72197 MRI PELVIS W/O & W/DYE: CPT

## 2024-09-16 ENCOUNTER — APPOINTMENT (OUTPATIENT)
Dept: SURGERY | Facility: CLINIC | Age: 69
End: 2024-09-16
Payer: MEDICARE

## 2024-09-16 VITALS
SYSTOLIC BLOOD PRESSURE: 137 MMHG | BODY MASS INDEX: 42.31 KG/M2 | TEMPERATURE: 98 F | HEIGHT: 71.5 IN | RESPIRATION RATE: 16 BRPM | DIASTOLIC BLOOD PRESSURE: 75 MMHG | WEIGHT: 309 LBS | HEART RATE: 89 BPM | OXYGEN SATURATION: 96 %

## 2024-09-16 DIAGNOSIS — K40.90 UNILATERAL INGUINAL HERNIA, W/OUT OBSTRUCTION OR GANGRENE, NOT SPECIFIED AS RECURRENT: ICD-10-CM

## 2024-09-16 PROCEDURE — 99214 OFFICE O/P EST MOD 30 MIN: CPT

## 2024-09-25 ENCOUNTER — APPOINTMENT (OUTPATIENT)
Dept: GASTROENTEROLOGY | Facility: CLINIC | Age: 69
End: 2024-09-25

## 2024-09-26 ENCOUNTER — APPOINTMENT (OUTPATIENT)
Dept: INTERNAL MEDICINE | Facility: CLINIC | Age: 69
End: 2024-09-26
Payer: MEDICARE

## 2024-09-26 VITALS
BODY MASS INDEX: 41.9 KG/M2 | SYSTOLIC BLOOD PRESSURE: 116 MMHG | WEIGHT: 306 LBS | HEIGHT: 71.5 IN | DIASTOLIC BLOOD PRESSURE: 74 MMHG

## 2024-09-26 DIAGNOSIS — I25.10 ATHEROSCLEROTIC HEART DISEASE OF NATIVE CORONARY ARTERY W/OUT ANGINA PECTORIS: ICD-10-CM

## 2024-09-26 DIAGNOSIS — E11.69 TYPE 2 DIABETES MELLITUS WITH OTHER SPECIFIED COMPLICATION: ICD-10-CM

## 2024-09-26 DIAGNOSIS — E88.9 TYPE 2 DIABETES MELLITUS WITH OTHER SPECIFIED COMPLICATION: ICD-10-CM

## 2024-09-26 DIAGNOSIS — E66.01 MORBID (SEVERE) OBESITY DUE TO EXCESS CALORIES: ICD-10-CM

## 2024-09-26 DIAGNOSIS — R53.83 OTHER FATIGUE: ICD-10-CM

## 2024-09-26 DIAGNOSIS — E11.59 TYPE 2 DIABETES MELLITUS WITH OTHER CIRCULATORY COMPLICATIONS: ICD-10-CM

## 2024-09-26 DIAGNOSIS — E66.9 OBESITY, UNSPECIFIED: ICD-10-CM

## 2024-09-26 DIAGNOSIS — I15.2 TYPE 2 DIABETES MELLITUS WITH OTHER CIRCULATORY COMPLICATIONS: ICD-10-CM

## 2024-09-26 DIAGNOSIS — E78.5 TYPE 2 DIABETES MELLITUS WITH OTHER SPECIFIED COMPLICATION: ICD-10-CM

## 2024-09-26 PROCEDURE — G2211 COMPLEX E/M VISIT ADD ON: CPT

## 2024-09-26 PROCEDURE — 36415 COLL VENOUS BLD VENIPUNCTURE: CPT

## 2024-09-26 PROCEDURE — 99214 OFFICE O/P EST MOD 30 MIN: CPT

## 2024-09-26 RX ORDER — INCLISIRAN 284 MG/1.5ML
284 INJECTION, SOLUTION SUBCUTANEOUS
Refills: 0 | Status: ACTIVE | COMMUNITY

## 2024-09-26 NOTE — PLAN
[FreeTextEntry1] : HTN- patient's BP well controlled with current medication. will continue current  regimen   HLD- patient will continue to receive Leqvio every 6 months by his cardiologist  DM-  Patient will continue all medication as prescribed. patient will continue to follow with endocrinologist Dr. Andujar  Prior to appointment and during encounter with patient extensive medical records were reviewed including but not limited to, Hospital records, out patient records, laboratory data and microbiology data   Total encounter total time 30 mins >50% of time spent counseling/coordinating care  Counseling included abnormal lab results, differential diagnoses, treatment options, risks and benefits, lifestyle changes, current condition, medications, and dose adjustments.  The patient was interactive, attentive, asked questions, and verbalized understanding

## 2024-09-26 NOTE — HISTORY OF PRESENT ILLNESS
[FreeTextEntry1] : follow up chronic medical conditions [de-identified] : Mr. AYLA BARONE is a 69 year male with a PMH of DM, HTN, HLD comes to the office for physical exam. Patient denies fever, cough SOB. No other complaints at this time.

## 2024-09-26 NOTE — HEALTH RISK ASSESSMENT
[Yes] : Yes [Monthly or less (1 pt)] : Monthly or less (1 point) [1 or 2 (0 pts)] : 1 or 2 (0 points) [Never (0 pts)] : Never (0 points) [No] : In the past 12 months have you used drugs other than those required for medical reasons? No [0] : 2) Feeling down, depressed, or hopeless: Not at all (0) [PHQ-2 Negative - No further assessment needed] : PHQ-2 Negative - No further assessment needed [Audit-CScore] : 1 [[id number]] : 0 [Never] : Never

## 2024-09-30 LAB
ALBUMIN SERPL ELPH-MCNC: 4.5 G/DL
ALP BLD-CCNC: 63 U/L
ALT SERPL-CCNC: 190 U/L
ANION GAP SERPL CALC-SCNC: 15 MMOL/L
AST SERPL-CCNC: 119 U/L
BASOPHILS # BLD AUTO: 0.04 K/UL
BASOPHILS NFR BLD AUTO: 0.7 %
BILIRUB SERPL-MCNC: 0.9 MG/DL
BUN SERPL-MCNC: 21 MG/DL
CALCIUM SERPL-MCNC: 10.2 MG/DL
CHLORIDE SERPL-SCNC: 100 MMOL/L
CHOLEST SERPL-MCNC: 155 MG/DL
CO2 SERPL-SCNC: 27 MMOL/L
CREAT SERPL-MCNC: 1.18 MG/DL
EGFR: 67 ML/MIN/1.73M2
EOSINOPHIL # BLD AUTO: 0.18 K/UL
EOSINOPHIL NFR BLD AUTO: 3.1 %
ESTIMATED AVERAGE GLUCOSE: 160 MG/DL
GLUCOSE SERPL-MCNC: 197 MG/DL
HBA1C MFR BLD HPLC: 7.2 %
HCT VFR BLD CALC: 47.8 %
HDLC SERPL-MCNC: 35 MG/DL
HGB BLD-MCNC: 15 G/DL
IMM GRANULOCYTES NFR BLD AUTO: 0.3 %
LDLC SERPL CALC-MCNC: 87 MG/DL
LYMPHOCYTES # BLD AUTO: 1.21 K/UL
LYMPHOCYTES NFR BLD AUTO: 20.5 %
MAN DIFF?: NORMAL
MCHC RBC-ENTMCNC: 29.2 PG
MCHC RBC-ENTMCNC: 31.4 GM/DL
MCV RBC AUTO: 93.2 FL
MONOCYTES # BLD AUTO: 0.52 K/UL
MONOCYTES NFR BLD AUTO: 8.8 %
NEUTROPHILS # BLD AUTO: 3.92 K/UL
NEUTROPHILS NFR BLD AUTO: 66.6 %
NONHDLC SERPL-MCNC: 120 MG/DL
PLATELET # BLD AUTO: 182 K/UL
POTASSIUM SERPL-SCNC: 4.3 MMOL/L
PROT SERPL-MCNC: 7.1 G/DL
RBC # BLD: 5.13 M/UL
RBC # FLD: 13.7 %
SODIUM SERPL-SCNC: 141 MMOL/L
TRIGL SERPL-MCNC: 192 MG/DL
TSH SERPL-ACNC: 1.93 UIU/ML
WBC # FLD AUTO: 5.89 K/UL

## 2024-10-23 ENCOUNTER — OUTPATIENT (OUTPATIENT)
Dept: OUTPATIENT SERVICES | Facility: HOSPITAL | Age: 69
LOS: 1 days | End: 2024-10-23
Payer: MEDICARE

## 2024-10-23 VITALS
HEIGHT: 74 IN | OXYGEN SATURATION: 96 % | RESPIRATION RATE: 18 BRPM | HEART RATE: 72 BPM | DIASTOLIC BLOOD PRESSURE: 72 MMHG | TEMPERATURE: 97 F | WEIGHT: 306.44 LBS | SYSTOLIC BLOOD PRESSURE: 120 MMHG

## 2024-10-23 DIAGNOSIS — E11.9 TYPE 2 DIABETES MELLITUS WITHOUT COMPLICATIONS: ICD-10-CM

## 2024-10-23 DIAGNOSIS — Z98.890 OTHER SPECIFIED POSTPROCEDURAL STATES: Chronic | ICD-10-CM

## 2024-10-23 DIAGNOSIS — M23.90 UNSPECIFIED INTERNAL DERANGEMENT OF UNSPECIFIED KNEE: Chronic | ICD-10-CM

## 2024-10-23 DIAGNOSIS — I10 ESSENTIAL (PRIMARY) HYPERTENSION: ICD-10-CM

## 2024-10-23 DIAGNOSIS — G47.30 SLEEP APNEA, UNSPECIFIED: ICD-10-CM

## 2024-10-23 DIAGNOSIS — K40.90 UNILATERAL INGUINAL HERNIA, WITHOUT OBSTRUCTION OR GANGRENE, NOT SPECIFIED AS RECURRENT: ICD-10-CM

## 2024-10-23 DIAGNOSIS — Z01.818 ENCOUNTER FOR OTHER PREPROCEDURAL EXAMINATION: ICD-10-CM

## 2024-10-23 DIAGNOSIS — K75.81 NONALCOHOLIC STEATOHEPATITIS (NASH): ICD-10-CM

## 2024-10-23 DIAGNOSIS — Z98.89 OTHER SPECIFIED POSTPROCEDURAL STATES: Chronic | ICD-10-CM

## 2024-10-23 DIAGNOSIS — Z29.9 ENCOUNTER FOR PROPHYLACTIC MEASURES, UNSPECIFIED: ICD-10-CM

## 2024-10-23 LAB
A1C WITH ESTIMATED AVERAGE GLUCOSE RESULT: 7.4 % — HIGH (ref 4–5.6)
ALBUMIN SERPL ELPH-MCNC: 4 G/DL — SIGNIFICANT CHANGE UP (ref 3.3–5.2)
ALP SERPL-CCNC: 62 U/L — SIGNIFICANT CHANGE UP (ref 40–120)
ALT FLD-CCNC: 144 U/L — HIGH
ANION GAP SERPL CALC-SCNC: 12 MMOL/L — SIGNIFICANT CHANGE UP (ref 5–17)
AST SERPL-CCNC: 86 U/L — HIGH
BASOPHILS # BLD AUTO: 0.04 K/UL — SIGNIFICANT CHANGE UP (ref 0–0.2)
BASOPHILS NFR BLD AUTO: 0.7 % — SIGNIFICANT CHANGE UP (ref 0–2)
BILIRUB SERPL-MCNC: 0.4 MG/DL — SIGNIFICANT CHANGE UP (ref 0.4–2)
BLD GP AB SCN SERPL QL: SIGNIFICANT CHANGE UP
BUN SERPL-MCNC: 17.6 MG/DL — SIGNIFICANT CHANGE UP (ref 8–20)
CALCIUM SERPL-MCNC: 9.2 MG/DL — SIGNIFICANT CHANGE UP (ref 8.4–10.5)
CHLORIDE SERPL-SCNC: 101 MMOL/L — SIGNIFICANT CHANGE UP (ref 96–108)
CO2 SERPL-SCNC: 26 MMOL/L — SIGNIFICANT CHANGE UP (ref 22–29)
CREAT SERPL-MCNC: 1.17 MG/DL — SIGNIFICANT CHANGE UP (ref 0.5–1.3)
EGFR: 68 ML/MIN/1.73M2 — SIGNIFICANT CHANGE UP
EOSINOPHIL # BLD AUTO: 0.16 K/UL — SIGNIFICANT CHANGE UP (ref 0–0.5)
EOSINOPHIL NFR BLD AUTO: 3 % — SIGNIFICANT CHANGE UP (ref 0–6)
ESTIMATED AVERAGE GLUCOSE: 166 MG/DL — HIGH (ref 68–114)
GLUCOSE SERPL-MCNC: 176 MG/DL — HIGH (ref 70–99)
HCT VFR BLD CALC: 41.3 % — SIGNIFICANT CHANGE UP (ref 39–50)
HGB BLD-MCNC: 13.8 G/DL — SIGNIFICANT CHANGE UP (ref 13–17)
IMM GRANULOCYTES NFR BLD AUTO: 0.6 % — SIGNIFICANT CHANGE UP (ref 0–0.9)
LYMPHOCYTES # BLD AUTO: 1.17 K/UL — SIGNIFICANT CHANGE UP (ref 1–3.3)
LYMPHOCYTES # BLD AUTO: 21.7 % — SIGNIFICANT CHANGE UP (ref 13–44)
MCHC RBC-ENTMCNC: 29.3 PG — SIGNIFICANT CHANGE UP (ref 27–34)
MCHC RBC-ENTMCNC: 33.4 GM/DL — SIGNIFICANT CHANGE UP (ref 32–36)
MCV RBC AUTO: 87.7 FL — SIGNIFICANT CHANGE UP (ref 80–100)
MONOCYTES # BLD AUTO: 0.54 K/UL — SIGNIFICANT CHANGE UP (ref 0–0.9)
MONOCYTES NFR BLD AUTO: 10 % — SIGNIFICANT CHANGE UP (ref 2–14)
MRSA PCR RESULT.: SIGNIFICANT CHANGE UP
NEUTROPHILS # BLD AUTO: 3.44 K/UL — SIGNIFICANT CHANGE UP (ref 1.8–7.4)
NEUTROPHILS NFR BLD AUTO: 64 % — SIGNIFICANT CHANGE UP (ref 43–77)
PLATELET # BLD AUTO: 156 K/UL — SIGNIFICANT CHANGE UP (ref 150–400)
POTASSIUM SERPL-MCNC: 3.9 MMOL/L — SIGNIFICANT CHANGE UP (ref 3.5–5.3)
POTASSIUM SERPL-SCNC: 3.9 MMOL/L — SIGNIFICANT CHANGE UP (ref 3.5–5.3)
PROT SERPL-MCNC: 6.9 G/DL — SIGNIFICANT CHANGE UP (ref 6.6–8.7)
RBC # BLD: 4.71 M/UL — SIGNIFICANT CHANGE UP (ref 4.2–5.8)
RBC # FLD: 13.3 % — SIGNIFICANT CHANGE UP (ref 10.3–14.5)
S AUREUS DNA NOSE QL NAA+PROBE: SIGNIFICANT CHANGE UP
SODIUM SERPL-SCNC: 139 MMOL/L — SIGNIFICANT CHANGE UP (ref 135–145)
WBC # BLD: 5.38 K/UL — SIGNIFICANT CHANGE UP (ref 3.8–10.5)
WBC # FLD AUTO: 5.38 K/UL — SIGNIFICANT CHANGE UP (ref 3.8–10.5)

## 2024-10-23 PROCEDURE — 93010 ELECTROCARDIOGRAM REPORT: CPT

## 2024-10-23 NOTE — H&P PST ADULT - NSICDXPASTSURGICALHX_GEN_ALL_CORE_FT
PAST SURGICAL HISTORY:  Knee derangement surgery right knee    S/P colonoscopy      PAST SURGICAL HISTORY:  H/O umbilical hernia repair     Knee derangement surgery right knee    S/P colonoscopy     S/P right knee arthroscopy

## 2024-10-23 NOTE — H&P PST ADULT - PROBLEM SELECTOR PLAN 1
he is scheduled 11/12/2024 for robotic bilateral inguinal hernia repair with Dr. Cheek.   Patient educated on clearances, surgical scrub, labs/diagnostics performed, preadmission instructions,  and day of procedure medications- verbalized understanding, teach back method utilized.   Stop vitamins/ supplements/ NSAIDs 5 days prior to procedure.   advised on medication use as per periop protocol (see written forms)  pending medical clearance  pending cardiology clearance.

## 2024-10-23 NOTE — H&P PST ADULT - NSICDXFAMILYHX_GEN_ALL_CORE_FT
FAMILY HISTORY:  Father  Still living? Yes, Estimated age: 81-90  Family history of myocardial infarction, Age at diagnosis: Age Unknown     FAMILY HISTORY:  FH: kidney disease  FH: type 2 diabetes    Father  Still living? Yes, Estimated age: 81-90  Family history of myocardial infarction, Age at diagnosis: Age Unknown

## 2024-10-23 NOTE — H&P PST ADULT - ASSESSMENT
CAPRINI SCORE    AGE RELATED RISK FACTORS                                                             [ ] Age 41-60 years                                            (1 Point)  [ ] Age: 61-74 years                                           (2 Points)                 [ ] Age= 75 years                                                (3 Points)             DISEASE RELATED RISK FACTORS                                                       [ ] Edema in the lower extremities                 (1 Point)                     [ ] Varicose veins                                               (1 Point)                                 [ ] BMI > 25 Kg/m2                                            (1 Point)                                  [ ] Serious infection (ie PNA)                            (1 Point)                     [ ] Lung disease ( COPD, Emphysema)            (1 Point)                                                                          [ ] Acute myocardial infarction                         (1 Point)                  [ ] Congestive heart failure (in the previous month)  (1 Point)         [ ] Inflammatory bowel disease                            (1 Point)                  [ ] Central venous access, PICC or Port               (2 points)       (within the last month)                                                                [ ] Stroke (in the previous month)                        (5 Points)    [ ] Previous or present malignancy                       (2 points)                                                                                                                                                         HEMATOLOGY RELATED FACTORS                                                         [ ] Prior episodes of VTE                                     (3 Points)                     [ ] Positive family history for VTE                      (3 Points)                  [ ] Prothrombin 02792 A                                     (3 Points)                     [ ] Factor V Leiden                                                (3 Points)                        [ ] Lupus anticoagulants                                      (3 Points)                                                           [ ] Anticardiolipin antibodies                              (3 Points)                                                       [ ] High homocysteine in the blood                   (3 Points)                                             [ ] Other congenital or acquired thrombophilia      (3 Points)                                                [ ] Heparin induced thrombocytopenia                  (3 Points)                                        MOBILITY RELATED FACTORS  [ ] Bed rest                                                         (1 Point)  [ ] Plaster cast                                                    (2 points)  [ ] Bed bound for more than 72 hours           (2 Points)    GENDER SPECIFIC FACTORS  [ ] Pregnancy or had a baby within the last month   (1 Point)  [ ] Post-partum < 6 weeks                                   (1 Point)  [ ] Hormonal therapy  or oral contraception   (1 Point)  [ ] History of pregnancy complications              (1 point)  [ ] Unexplained or recurrent              (1 Point)    OTHER RISK FACTORS                                           (1 Point)  [ ] BMI >40, smoking, diabetes requiring insulin, chemotherapy  blood transfusions and length of surgery over 2 hours    SURGERY RELATED RISK FACTORS  [ ]  Section within the last month     (1 Point)  [ ] Minor surgery                                                  (1 Point)  [ ] Arthroscopic surgery                                       (2 Points)  [ ] Planned major surgery lasting more            (2 Points)      than 45 minutes     [ ] Elective hip or knee joint replacement       (5 points)       surgery                                                TRAUMA RELATED RISK FACTORS  [ ] Fracture of the hip, pelvis, or leg                       (5 Points)  [ ] Spinal cord injury resulting in paralysis             (5 points)       (in the previous month)    [ ] Paralysis  (less than 1 month)                             (5 Points)  [ ] Multiple Trauma within 1 month                        (5 Points)    Total Score [        ]    Caprini Score 0-2: Low Risk, NO VTE prophylaxis required for most patients, encourage ambulation  Caprini Score 3-6: Moderate Risk , pharmacologic VTE prophylaxis is indicated for most patients (in the absence of contraindications)  Caprini Score Greater than or =7: High risk, pharmocologic VTE prophylaxis indicated for most patients (in the absence of contraindications)    OPIOID RISK TOOL    BRANDT EACH BOX THAT APPLIES AND ADD TOTALS AT THE END    FAMILY HISTORY OF SUBSTANCE ABUSE                 FEMALE         MALE                                                Alcohol                             [  ]1 pt          [  ]3pts                                               Illegal Durgs                     [  ]2 pts        [  ]3pts                                               Rx Drugs                           [  ]4 pts        [  ]4 pts    PERSONAL HISTORY OF SUBSTANCE ABUSE                                                                                          Alcohol                             [  ]3 pts       [  ]3 pts                                               Illegal Drugs                     [  ]4 pts        [  ]4 pts                                               Rx Drugs                           [  ]5 pts        [  ]5 pts    AGE BETWEEN 16-45 YEARS                                      [  ]1 pt         [  ]1 pt    HISTORY OF PREADOLESCENT   SEXUAL ABUSE                                                             [  ]3 pts        [  ]0pts    PSYCHOLOGICAL DISEASE                     ADD, OCD, Bipolar, Schizophrenia        [  ]2 pts         [  ]2 pts                      Depression                                               [  ]1 pt           [  ]1 pt           SCORING TOTAL   (add numbers and type here)              (***)                                     A score of 3 or lower indicated LOW risk for future opioid abuse  A score of 4 to 7 indicated moderate risk for future opioid abuse  A score of 8 or higher indicates a high risk for opioid abuse     70yo M patient Dr. 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 patient reports hx ruy groin pain for about 3 months, 7/10, intermittent, worsens with certain movements/ crossing legs, conservative mgmt includes activity modification. he went to Dr. Nails and was advised ruy inguinal hernia based on imaging, advised f/u with Dr. Cheek for surgical repair of ruy. inguinal hernia. patient reports having followed up with Dr. Cheek, reviewed imaging with Dr. Cheek, patient reportedly recommended bilateral inguinal hernia repair based on imaging and evaluation. he is scheduled 2024 for robotic bilateral inguinal hernia repair with Dr. Cheek. he o/w reports feeling well with no other acute concerns.       CAPRINI SCORE    AGE RELATED RISK FACTORS                                                             [ ] Age 41-60 years                                            (1 Point)  [x ] Age: 61-74 years                                           (2 Points)                 [ ] Age= 75 years                                                (3 Points)             DISEASE RELATED RISK FACTORS                                                       [ ] Edema in the lower extremities                 (1 Point)                     [ ] Varicose veins                                               (1 Point)                                 [x ] BMI > 25 Kg/m2                                            (1 Point)                                  [ ] Serious infection (ie PNA)                            (1 Point)                     [ ] Lung disease ( COPD, Emphysema)            (1 Point)                                                                          [ ] Acute myocardial infarction                         (1 Point)                  [ ] Congestive heart failure (in the previous month)  (1 Point)         [ ] Inflammatory bowel disease                            (1 Point)                  [ ] Central venous access, PICC or Port               (2 points)       (within the last month)                                                                [ ] Stroke (in the previous month)                        (5 Points)    [ ] Previous or present malignancy                       (2 points)                                                                                                                                                         HEMATOLOGY RELATED FACTORS                                                         [ ] Prior episodes of VTE                                     (3 Points)                     [ ] Positive family history for VTE                      (3 Points)                  [ ] Prothrombin 19234 A                                     (3 Points)                     [ ] Factor V Leiden                                                (3 Points)                        [ ] Lupus anticoagulants                                      (3 Points)                                                           [ ] Anticardiolipin antibodies                              (3 Points)                                                       [ ] High homocysteine in the blood                   (3 Points)                                             [ ] Other congenital or acquired thrombophilia      (3 Points)                                                [ ] Heparin induced thrombocytopenia                  (3 Points)                                        MOBILITY RELATED FACTORS  [ ] Bed rest                                                         (1 Point)  [ ] Plaster cast                                                    (2 points)  [ ] Bed bound for more than 72 hours           (2 Points)    GENDER SPECIFIC FACTORS  [ ] Pregnancy or had a baby within the last month   (1 Point)  [ ] Post-partum < 6 weeks                                   (1 Point)  [ ] Hormonal therapy  or oral contraception   (1 Point)  [ ] History of pregnancy complications              (1 point)  [ ] Unexplained or recurrent              (1 Point)    OTHER RISK FACTORS                                           (1 Point)  [ x] BMI >40, smoking, diabetes requiring insulin, chemotherapy  blood transfusions and length of surgery over 2 hours    SURGERY RELATED RISK FACTORS  [ ]  Section within the last month     (1 Point)  [ ] Minor surgery                                                  (1 Point)  [ ] Arthroscopic surgery                                       (2 Points)  [x ] Planned major surgery lasting more            (2 Points)      than 45 minutes     [ ] Elective hip or knee joint replacement       (5 points)       surgery                                                TRAUMA RELATED RISK FACTORS  [ ] Fracture of the hip, pelvis, or leg                       (5 Points)  [ ] Spinal cord injury resulting in paralysis             (5 points)       (in the previous month)    [ ] Paralysis  (less than 1 month)                             (5 Points)  [ ] Multiple Trauma within 1 month                        (5 Points)    Total Score [     6   ]    Caprini Score 0-2: Low Risk, NO VTE prophylaxis required for most patients, encourage ambulation  Caprini Score 3-6: Moderate Risk , pharmacologic VTE prophylaxis is indicated for most patients (in the absence of contraindications)  Caprini Score Greater than or =7: High risk, pharmocologic VTE prophylaxis indicated for most patients (in the absence of contraindications)    OPIOID RISK TOOL    BRANDT EACH BOX THAT APPLIES AND ADD TOTALS AT THE END    FAMILY HISTORY OF SUBSTANCE ABUSE                 FEMALE         MALE                                                Alcohol                             [  ]1 pt          [  ]3pts                                               Illegal Durgs                     [  ]2 pts        [  ]3pts                                               Rx Drugs                           [  ]4 pts        [  ]4 pts    PERSONAL HISTORY OF SUBSTANCE ABUSE                                                                                          Alcohol                             [  ]3 pts       [  ]3 pts                                               Illegal Drugs                     [  ]4 pts        [  ]4 pts                                               Rx Drugs                           [  ]5 pts        [  ]5 pts    AGE BETWEEN 16-45 YEARS                                      [  ]1 pt         [  ]1 pt    HISTORY OF PREADOLESCENT   SEXUAL ABUSE                                                             [  ]3 pts        [  ]0pts    PSYCHOLOGICAL DISEASE                     ADD, OCD, Bipolar, Schizophrenia        [  ]2 pts         [  ]2 pts                      Depression                                               [  ]1 pt           [  ]1 pt           SCORING TOTAL   (add numbers and type here)              (0)                                     A score of 3 or lower indicated LOW risk for future opioid abuse  A score of 4 to 7 indicated moderate risk for future opioid abuse  A score of 8 or higher indicates a high risk for opioid abuse

## 2024-10-23 NOTE — H&P PST ADULT - EKG AND INTERPRETATION
Recorded message left with return contact information.  
SR [id number], septal infarct, age undetermined  pending final read pending cardiology clearance.

## 2024-10-23 NOTE — H&P PST ADULT - NSICDXPASTMEDICALHX_GEN_ALL_CORE_FT
PAST MEDICAL HISTORY:  DM (diabetes mellitus), type 2     Elevated LFTs     HLD (hyperlipidemia)     HTN (hypertension)     Metabolic dysfunction-associated steatohepatitis (MASH)     Obese

## 2024-10-23 NOTE — H&P PST ADULT - HISTORY OF PRESENT ILLNESS
70yo M patient Dr. 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 patient reports...  he is scheduled 11/12/2024 for robotic bilateral inguinal hernia repair with Dr. Cheek. he o/w reports feeling well with no other acute concerns.  70yo M patient Dr. 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 patient reports hx ruy groin pain for about 3 months, 7/10, intermittent, worsens with certain movements/ crossing legs, conservative mgmt includes activity modification. he went to Dr. Nails and was advised ruy inguinal hernia based on imaging, advised f/u with Dr. Cheek for surgical repair of ruy. inguinal hernia. patient reports having followed up with Dr. Cheek, reviewed imaging with Dr. Cheek, patient reportedly recommended bilateral inguinal hernia repair based on imaging and evaluation. he is scheduled 11/12/2024 for robotic bilateral inguinal hernia repair with Dr. Cheek. he o/w reports feeling well with no other acute concerns.

## 2024-10-23 NOTE — H&P PST ADULT - PROBLEM SELECTOR PLAN 4
BP checked today.  ECG reviewed.  advised on medication use as per periop protocol (see written forms)
normal for race

## 2024-10-23 NOTE — H&P PST ADULT - GENITOURINARY COMMENTS
ruy inguinal hernia, see HPI denies changes in hernias since last evaluated with Dr. Cheek, defers eval today

## 2024-11-07 ENCOUNTER — APPOINTMENT (OUTPATIENT)
Dept: INTERNAL MEDICINE | Facility: CLINIC | Age: 69
End: 2024-11-07
Payer: MEDICARE

## 2024-11-07 VITALS
BODY MASS INDEX: 41.9 KG/M2 | HEIGHT: 71.5 IN | SYSTOLIC BLOOD PRESSURE: 130 MMHG | DIASTOLIC BLOOD PRESSURE: 70 MMHG | WEIGHT: 306 LBS

## 2024-11-07 DIAGNOSIS — E11.59 TYPE 2 DIABETES MELLITUS WITH OTHER CIRCULATORY COMPLICATIONS: ICD-10-CM

## 2024-11-07 DIAGNOSIS — K40.90 UNILATERAL INGUINAL HERNIA, W/OUT OBSTRUCTION OR GANGRENE, NOT SPECIFIED AS RECURRENT: ICD-10-CM

## 2024-11-07 DIAGNOSIS — I15.2 TYPE 2 DIABETES MELLITUS WITH OTHER CIRCULATORY COMPLICATIONS: ICD-10-CM

## 2024-11-07 DIAGNOSIS — Z01.818 ENCOUNTER FOR OTHER PREPROCEDURAL EXAMINATION: ICD-10-CM

## 2024-11-07 DIAGNOSIS — E11.69 TYPE 2 DIABETES MELLITUS WITH OTHER SPECIFIED COMPLICATION: ICD-10-CM

## 2024-11-07 DIAGNOSIS — E78.5 TYPE 2 DIABETES MELLITUS WITH OTHER SPECIFIED COMPLICATION: ICD-10-CM

## 2024-11-07 PROCEDURE — G2211 COMPLEX E/M VISIT ADD ON: CPT

## 2024-11-07 PROCEDURE — 99214 OFFICE O/P EST MOD 30 MIN: CPT

## 2024-11-11 NOTE — ASU PATIENT PROFILE, ADULT - NSICDXPASTSURGICALHX_GEN_ALL_CORE_FT
PAST SURGICAL HISTORY:  H/O umbilical hernia repair     Knee derangement surgery right knee    S/P colonoscopy     S/P right knee arthroscopy

## 2024-11-12 ENCOUNTER — TRANSCRIPTION ENCOUNTER (OUTPATIENT)
Age: 69
End: 2024-11-12

## 2024-11-12 ENCOUNTER — OUTPATIENT (OUTPATIENT)
Dept: INPATIENT UNIT | Facility: HOSPITAL | Age: 69
LOS: 1 days | End: 2024-11-12
Payer: MEDICARE

## 2024-11-12 ENCOUNTER — APPOINTMENT (OUTPATIENT)
Dept: SURGERY | Facility: HOSPITAL | Age: 69
End: 2024-11-12

## 2024-11-12 VITALS
RESPIRATION RATE: 16 BRPM | SYSTOLIC BLOOD PRESSURE: 131 MMHG | DIASTOLIC BLOOD PRESSURE: 75 MMHG | HEART RATE: 80 BPM | TEMPERATURE: 97 F | OXYGEN SATURATION: 98 %

## 2024-11-12 VITALS
WEIGHT: 295.42 LBS | TEMPERATURE: 97 F | HEIGHT: 74 IN | HEART RATE: 80 BPM | OXYGEN SATURATION: 97 % | DIASTOLIC BLOOD PRESSURE: 80 MMHG | RESPIRATION RATE: 19 BRPM | SYSTOLIC BLOOD PRESSURE: 147 MMHG

## 2024-11-12 DIAGNOSIS — Z98.89 OTHER SPECIFIED POSTPROCEDURAL STATES: Chronic | ICD-10-CM

## 2024-11-12 DIAGNOSIS — Z98.890 OTHER SPECIFIED POSTPROCEDURAL STATES: Chronic | ICD-10-CM

## 2024-11-12 DIAGNOSIS — K40.90 UNILATERAL INGUINAL HERNIA, WITHOUT OBSTRUCTION OR GANGRENE, NOT SPECIFIED AS RECURRENT: ICD-10-CM

## 2024-11-12 DIAGNOSIS — M23.90 UNSPECIFIED INTERNAL DERANGEMENT OF UNSPECIFIED KNEE: Chronic | ICD-10-CM

## 2024-11-12 LAB
ABO RH CONFIRMATION: SIGNIFICANT CHANGE UP
GLUCOSE BLDC GLUCOMTR-MCNC: 111 MG/DL — HIGH (ref 70–99)
GLUCOSE BLDC GLUCOMTR-MCNC: 127 MG/DL — HIGH (ref 70–99)
GLUCOSE BLDC GLUCOMTR-MCNC: 133 MG/DL — HIGH (ref 70–99)

## 2024-11-12 PROCEDURE — S2900: CPT

## 2024-11-12 PROCEDURE — 49650 LAP ING HERNIA REPAIR INIT: CPT | Mod: RT

## 2024-11-12 PROCEDURE — 82962 GLUCOSE BLOOD TEST: CPT

## 2024-11-12 PROCEDURE — 36415 COLL VENOUS BLD VENIPUNCTURE: CPT

## 2024-11-12 PROCEDURE — S2900 ROBOTIC SURGICAL SYSTEM: CPT | Mod: NC

## 2024-11-12 PROCEDURE — C1781: CPT

## 2024-11-12 PROCEDURE — C9399: CPT

## 2024-11-12 DEVICE — MESH HERNIA INGUINAL PROGRIP LAPAROSCOPIC 15 X 10CM RIGHT: Type: IMPLANTABLE DEVICE | Status: FUNCTIONAL

## 2024-11-12 RX ORDER — NEBIVOLOL 10 MG/1
1 TABLET ORAL
Refills: 0 | DISCHARGE

## 2024-11-12 RX ORDER — INCLISIRAN 284 MG/1.5ML
284 INJECTION, SOLUTION SUBCUTANEOUS
Refills: 0 | DISCHARGE

## 2024-11-12 RX ORDER — HYDROMORPHONE HCL/0.9% NACL/PF 6 MG/30 ML
0.5 PATIENT CONTROLLED ANALGESIA SYRINGE INTRAVENOUS
Refills: 0 | Status: DISCONTINUED | OUTPATIENT
Start: 2024-11-12 | End: 2024-11-12

## 2024-11-12 RX ORDER — KETOROLAC TROMETHAMINE 30 MG/ML
30 INJECTION INTRAMUSCULAR; INTRAVENOUS ONCE
Refills: 0 | Status: DISCONTINUED | OUTPATIENT
Start: 2024-11-12 | End: 2024-11-12

## 2024-11-12 RX ORDER — METFORMIN HYDROCHLORIDE 500 MG/1
1 TABLET, EXTENDED RELEASE ORAL
Refills: 0 | DISCHARGE

## 2024-11-12 RX ORDER — CEFAZOLIN SODIUM 1 G
2000 VIAL (EA) INJECTION ONCE
Refills: 0 | Status: DISCONTINUED | OUTPATIENT
Start: 2024-11-12 | End: 2024-11-12

## 2024-11-12 RX ORDER — TIRZEPATIDE 5 MG/.5ML
7.5 INJECTION, SOLUTION SUBCUTANEOUS
Refills: 0 | DISCHARGE

## 2024-11-12 RX ORDER — FENTANYL CITRAT/DEXTROSE 5%/PF 1250MCG/50
50 PATIENT CONTROLLED ANALGESIA SYRINGE INTRAVENOUS
Refills: 0 | Status: DISCONTINUED | OUTPATIENT
Start: 2024-11-12 | End: 2024-11-12

## 2024-11-12 RX ORDER — ACETAMINOPHEN 500 MG
975 TABLET ORAL ONCE
Refills: 0 | Status: COMPLETED | OUTPATIENT
Start: 2024-11-12 | End: 2024-11-12

## 2024-11-12 RX ORDER — ONDANSETRON HYDROCHLORIDE 2 MG/ML
4 INJECTION, SOLUTION INTRAMUSCULAR; INTRAVENOUS ONCE
Refills: 0 | Status: DISCONTINUED | OUTPATIENT
Start: 2024-11-12 | End: 2024-11-12

## 2024-11-12 RX ORDER — DAPAGLIFLOZIN 10 MG/1
1 TABLET, FILM COATED ORAL
Refills: 0 | DISCHARGE

## 2024-11-12 RX ORDER — BUPIVACAINE 13.3 MG/ML
20 INJECTION, SUSPENSION, LIPOSOMAL INFILTRATION ONCE
Refills: 0 | Status: DISCONTINUED | OUTPATIENT
Start: 2024-11-12 | End: 2024-11-12

## 2024-11-12 RX ORDER — IRBESARTAN AND HYDROCHLOROTHIAZIDE 150; 12.5 MG/1; MG/1
1 TABLET ORAL
Refills: 0 | DISCHARGE

## 2024-11-12 RX ADMIN — Medication 0.5 MILLIGRAM(S): at 16:35

## 2024-11-12 RX ADMIN — Medication 975 MILLIGRAM(S): at 11:38

## 2024-11-12 RX ADMIN — KETOROLAC TROMETHAMINE 30 MILLIGRAM(S): 30 INJECTION INTRAMUSCULAR; INTRAVENOUS at 16:20

## 2024-11-12 RX ADMIN — Medication 0.5 MILLIGRAM(S): at 16:27

## 2024-11-12 RX ADMIN — KETOROLAC TROMETHAMINE 30 MILLIGRAM(S): 30 INJECTION INTRAMUSCULAR; INTRAVENOUS at 16:05

## 2024-11-12 NOTE — ASU PREOP CHECKLIST - TAMPON REMOVED
"Cardiology Progress Note       24 Hour events:  - due to PRA's, may proceed to LVAD over OHT, however will need to discuss with HF committee  - diuresed well yesterday      Physical Exam   Temp: 98.2  F (36.8  C) Temp src: Axillary BP: 117/68 Pulse: 84   Resp: 19 SpO2: 93 % O2 Device: None (Room air)      Vital Signs with Ranges  Temp:  [97.3  F (36.3  C)-98.2  F (36.8  C)] 98.2  F (36.8  C)  Pulse:  [70-88] 84  Resp:  [12-28] 19  BP: (102-136)/(57-89) 117/68  SpO2:  [92 %-98 %] 93 %    Intake/Output      Intake/Output Summary (Last 24 hours) at 6/20/2022 0706  Last data filed at 6/20/2022 0700  Gross per 24 hour   Intake 2831.8 ml   Output 2725 ml   Net 106.8 ml         Weight  Vitals:    06/17/22 1736 06/18/22 0400 06/20/22 0000   Weight: 67.4 kg (148 lb 9.4 oz) 68.5 kg (151 lb 0.2 oz) 67.9 kg (149 lb 11.1 oz)       Resp: 19        DOBUTamine 7.5 mcg/kg/min (06/20/22 0700)     heparin 1,100 Units/hr (06/20/22 0700)         aspirin  81 mg Oral Daily     ceFAZolin  2 g Intravenous Pre-Op/Pre-procedure x 1 dose     DULoxetine  30 mg Oral Daily     ferrous sulfate  325 mg Oral Daily with lunch     furosemide  80 mg Intravenous BID     hydrALAZINE  50 mg Oral Q8H CAMMY     hydroxychloroquine  200 mg Oral BID     isosorbide dinitrate  20 mg Oral TID     lidocaine  2 patch Transdermal Q24h    And     lidocaine   Transdermal Q8H CAMMY     potassium chloride  20 mEq Oral BID     potassium chloride  20 mEq Intravenous Once     tamsulosin  0.4 mg Oral Daily     traZODone  50 mg Oral At Bedtime        , Blood pressure 117/68, pulse 84, temperature 98.2  F (36.8  C), temperature source Axillary, resp. rate 19, height 1.702 m (5' 7\"), weight 67.9 kg (149 lb 11.1 oz), SpO2 93 %.  GEN:  Alert, oriented x 3, appears comfortable, NAD.  CV:  Regular rate and rhythm   LUNGS:  Clear to auscultation bilaterally   ABD:  Active bowel sounds, soft, non-tender/non-distended.  No rebound/guarding/rigidity.  EXT:  No edema or cyanosis.      Data "   Recent Labs   Lab Test 06/19/22  0607 06/19/22  0357 06/18/22  2354 06/18/22 2129 06/18/22 2125 06/18/22  1617   NA  --  138  --   --   --  136   POTASSIUM 3.8 4.0   < >  --    < > 3.2*   CHLORIDE  --  104  --   --   --  104   CO2  --  26  --   --   --  26   ANIONGAP  --  8  --   --   --  6   GLC  --  156*  --  153*  --  96   BUN  --  11  --   --   --  16   CR  --  0.58*  --   --   --  0.87   ELDA  --  8.0*  --   --   --  8.0*    < > = values in this interval not displayed.       Lab Results   Component Value Date    WBC 5.2 06/19/2022     Lab Results   Component Value Date    RBC 2.87 06/19/2022     Lab Results   Component Value Date    HGB 8.4 06/19/2022     Lab Results   Component Value Date    HCT 27.3 06/19/2022     No components found for: MCT  Lab Results   Component Value Date    MCV 95 06/19/2022     Lab Results   Component Value Date    MCH 29.3 06/19/2022     Lab Results   Component Value Date    MCHC 30.8 06/19/2022     Lab Results   Component Value Date    RDW 19.1 06/19/2022     Lab Results   Component Value Date     06/19/2022        Liver Function Studies -   Recent Labs   Lab Test 06/19/22  0357   PROTTOTAL 6.2*   ALBUMIN 2.9*   BILITOTAL 1.4*   ALKPHOS 102   *   ALT 1,342*       Venous Blood Gas  Recent Labs   Lab 06/20/22 0355 06/20/22 0003 06/19/22 2007 06/19/22  1447   PHV 7.42 7.44* 7.43 7.41   PCO2V 32* 36* 35* 37*   PO2V 35 34 34 31   HCO3V 21 25 23 23   RINA -3.6 0.5 -0.9 -1.3   O2PER 21 21 21 21       Arterial Blood Gas  Recent Labs   Lab 06/20/22 0355 06/20/22  0003 06/19/22 2007 06/19/22  1447   O2PER 21 21 21 21          Recent Results (from the past 24 hour(s))   XR Chest Port 1 View    Narrative    EXAM: XR CHEST PORT 1 VIEW  6/19/2022 10:47 AM      HISTORY: swan position after advancing    COMPARISON: 6/19/2022    FINDINGS: Single view of the chest. [place]-Janae catheter in the distal  left pulmonary artery. Intra-aortic balloon pump radiopaque marker  just above the  paola. Stable position of left chest wall cardiac  device with leads in the right atrium, right ventricle, and coronary  sinus. Right arm PICC tip in the high SVC. Trachea is midline. Stable  heart size. No new focal pulmonary opacity. No effusion or  pneumothorax.      Impression    IMPRESSION:  1. Jackson-Janae catheter in the distal left main pulmonary artery.  2. Intraaortic balloon pump radiopaque marker near the level of the  paola.    I have personally reviewed the examination and initial interpretation  and I agree with the findings.    CONSTANTINO OWEN MD         SYSTEM ID:  [id number]   XR Chest Port 1 View    Narrative    EXAM: XR CHEST PORT 1 VIEW  6/19/2022 11:24 AM      HISTORY: swan reposition to 50    COMPARISON: 6/19/2022    FINDINGS: Single view of the chest. Right IJ Jackson-Janae catheter tip  has been slightly retracted and remains in the mid left main pulmonary  artery. Stable right arm PICC and left chest wall cardiac device.  Intra-aortic balloon pump radiopaque marker positioned about 2.5 cm  above the paola.    Trachea is midline. Stable cardiac silhouette. Stable hazy pulmonary  opacities. No new focal consolidation. No large effusions. No  appreciable pneumothorax.      Impression    IMPRESSION:  1. Slightly retracted Jackson-Janae catheter with tip in the left  pulmonary artery.  2. Intra-aortic balloon pump radiopaque marker at the distal aortic  arch.  3. Stable hazy pulmonary opacities.    I have personally reviewed the examination and initial interpretation  and I agree with the findings.    CONSTANTINO OWEN MD         SYSTEM ID:  [id number]   XR Chest Port 1 View    Narrative    EXAM: XR CHEST PORT 1 VIEW  6/19/2022 4:03 PM      HISTORY: swan reposition    COMPARISON: 6/19/2022    FINDINGS: Single view of the chest. Jackson-Janae catheter tip in the  proximal left main pulmonary artery. Aortic balloon pump radiopaque  marker at the level of the paola. Stable position of left chest  wall  cardiac device and right upper extremity PICC catheter. Trachea is  midline. Stable cardiac silhouette. Mild perihilar and bibasilar  interstitial opacities are unchanged. No large effusion. No  appreciable pneumothorax.      Impression    IMPRESSION:  [place]-Janae catheter tip projects over proximal left main  pulmonary artery.    I have personally reviewed the examination and initial interpretation  and I agree with the findings.    JILL CARRANZA MD         SYSTEM ID:  [id number]       Blood culture:  No results found for this or any previous visit.   Urine culture:  Results for orders placed or performed during the hospital encounter of 05/16/22   Urine Culture Aerobic Bacterial    Specimen: Urine, Midstream   Result Value Ref Range    Culture No Growth        Assessment & Plan    61-year-old man with history of ischemic cardiomyopathy LVEF 15%) NYHA class IV symptoms ACC stage D presenting with multi-organ failure (JOSE; Acute liver injury/shock liver) after multiple admissions for heart failure over the past several months for evaluation of advanced therapies.    Changes Today:  - discuss in HF committee whether to list for OHT or perform LVAD  - continue inotrope, iABP, diuresis, hep gtt  - if OHT, will need subclavian iABP after P2Y12 washout (received last Friday)       CV:   #Ambulatory cardiogenic shock SCAI D  #Acute on chronic systolic heart failure  #Advanced ischemic cardiomyopathy, Class IV, Stage D  # SVT  # Nonsustained VT  # CAD s/p PCI to LAD (2005)  # Hx of MI (2007)  # Severe ostial LAD disease with in-stent restenosis of mid LAD at diag bifurcation, severe proximal RCA disease, mild circ disease now s/p ostial LAD and proximal RCA lithotripsy and stenting on 5/24/22  # S/p CRT-D ([employer] 2006, gen change 2012)  CMRI showing infarcted myocardium in LAD territory. Given the degree of LV dysfunction/dilation, moderate to severe functional MR and lack of viable myocardium, cardiac surgery  would be high risk. Now s/p ostial LAD and proximal RCA lithotripsy and stenting on 5/24/22.   - ASA and Coumadin (on hold) continue heparin gtt   - ASA 81 mg until 6/22 given stenting on 5/22      Plan:  --Inotropes: Dobutamine 7.5 --> decrease to 5.0  --Hypervolemic.diurese using swan as guide goal CVP 8-10  --Continue iABP  --hydral 50 mg Q8H  --isordil 20mg TID   --Anticoagulation: hep gtt once iABP in place  --Antiplatelet: Continue ASA-81mg  -- Statin: hold statin   -- BMP BID, K>4 & Mg>2  -- Strict I/Os  -- Daily weight    Pulmonary  # Mild-Moderate Emphysema  # Hx of COVID-19  Appreciate pulmonary consultation given emphysema  PFTs  Former smoker 2 ppd for 40 years quit on 09/09/09.     Renal:  -Strict I&O while diuresing   -Monitor Electrolytes while actively diuresing K->4 Mg->2    Gastroenterology:  # Severe malnutrition in the context of chronic illness  # Liver injury, in the setting of cardiogenic shock  # GERD  - Diuresis as above  -Supplements to help maintain nutrition.        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  # Insomnia  - PTA lorazepam 0.5-1 mg q6h PRN (pta q4h prn)  - PTA zolpidem 5mg at bedtime prn  - Hydroxyzine prn for anxiety  - Melatonin scheduled   - cymbalta 30mg; continue anxiety PRNs while in the hospital     Endocrine: TSH WNL HBA1C 5.5   -Continue to monitor FS while in ICU, Insulin GTT as needed    Hemathology:  # Hx of DVT and PE  #APL  # Anemia, chronic  - Continue pta ferrous sulfate 325mg daily        ID:  [id number]    Rheumatology  #Lupus  # APL  - Continue pta hydroxychloroquine 200mg bid   - PTA mycophenolic acid 1500mg q12h on hold in anticipation for LVAD  -- Would plan to hold MMF one week prior to surgery and re-starting 5-7 days after surgery in the absence of surgical site infection, poor wound healing or infection elsewhere.      - Continue pta tamsulosin 0.4mg daily    Diet: Low salt diet <2 g/24 hours  DVT Prophylaxis: Heparin GTT  Fitzgerald Catheter: In  place  Code Status: Full   Lines: PICC Line    LVAD/Transplant Evaluation Checklist  [x]?????? labs (CBC, CMP, PT/INR, cystatin C, prealbumin, UA + micro)  [x]?????? Infectious (Hep A/B/C, HIV, treponema, HSV 1/2 IgG, CMV IgG, Toxo IgG, EBV IgG, varicella IgG, Quant gold, COVID vaccine/PCR)  [x]?????? Utox/nicotine and cotinine/PeTH   [x]?????? Immunocompatibility (last transfusion, ABO, HLA tissue typing, PRA)  [x]?????? ECG, Echo  [x]?????? CPX - can be oupatient  [x]?????? 6MWT - can be outpatient  [x]?????? RHC, completed but needs repeat in 2-3 months to assess PVR  [x]?????? CVTS consult  [x]?????? Social work consult  [x]??????  Palliative care consult: final note pending  [x]?????? Neuropsych consult: order placed: final note pending  [x]?????? Nutrition consult  [x]?????? CT Dental   [x]?????? Dental consult  [x]?????? Abd US + doppler  - Abd US complete done, not with doppler; OK given unremarkable CT CAP  [x]?????? Extremity US and ABIs  [x]?????? Carotid US (if DM or ICM or >49yo)  [x]?????? PFTs: outpatient  [x]?????? Dexa: outpatient  [x]?????? CT head non-contrast  [x]?????? CT CAP non-contrast  [x]?????? Colonoscopy (>51 yo)  [x]?????? PSA/HCG      This patient was seen and discussed with Dr. Geno MD who agrees with the above assessment and plan.     Juancho Galan MD, MSc  Cardiovascular Disease Fellow  [facility]     n/a

## 2024-11-12 NOTE — ASU DISCHARGE PLAN (ADULT/PEDIATRIC) - NS MD DC FALL RISK RISK
home
For information on Fall & Injury Prevention, visit: [email or web address] OR  [email or web address] OR  https://www.cdc.gov/steadi/patient.html

## 2024-11-12 NOTE — BRIEF OPERATIVE NOTE - OPERATION/FINDINGS
Abd entered using Veress and ports placed under direct visualization. right Hernia defect identified and reduced. Peritoneal flap created and mesh placed in anatomically appropriate position. Flap closure using 3.0 Vloc. Port sites closed with monocryl and dermabond.

## 2024-11-12 NOTE — ASU DISCHARGE PLAN (ADULT/PEDIATRIC) - FINANCIAL ASSISTANCE
[facility] provides services at a reduced cost to those who are determined to be eligible through [facility]’s financial assistance program. Information regarding [facility]’s financial assistance program can be found by going to [email or web address] or by calling [phone].

## 2024-11-12 NOTE — ASU DISCHARGE PLAN (ADULT/PEDIATRIC) - CARE PROVIDER_API CALL
Tyson Cheek Justin  Surgery  [address] [place] [id number]  Phone: [phone]  Fax: [phone]  Follow Up Time:

## 2024-11-18 PROBLEM — E11.9 TYPE 2 DIABETES MELLITUS WITHOUT COMPLICATIONS: Chronic | Status: ACTIVE | Noted: 2024-10-23

## 2024-11-18 PROBLEM — E78.5 HYPERLIPIDEMIA, UNSPECIFIED: Chronic | Status: ACTIVE | Noted: 2024-10-23

## 2024-11-18 PROBLEM — K75.81 NONALCOHOLIC STEATOHEPATITIS (NASH): Chronic | Status: ACTIVE | Noted: 2024-10-23

## 2024-11-18 PROBLEM — R79.89 OTHER SPECIFIED ABNORMAL FINDINGS OF BLOOD CHEMISTRY: Chronic | Status: ACTIVE | Noted: 2024-10-23

## 2024-11-18 PROBLEM — I10 ESSENTIAL (PRIMARY) HYPERTENSION: Chronic | Status: ACTIVE | Noted: 2024-10-23

## 2024-11-20 PROCEDURE — 80053 COMPREHEN METABOLIC PANEL: CPT

## 2024-11-20 PROCEDURE — 85025 COMPLETE CBC W/AUTO DIFF WBC: CPT

## 2024-11-20 PROCEDURE — 86850 RBC ANTIBODY SCREEN: CPT

## 2024-11-20 PROCEDURE — 86900 BLOOD TYPING SEROLOGIC ABO: CPT

## 2024-11-20 PROCEDURE — G0463: CPT

## 2024-11-20 PROCEDURE — 87640 STAPH A DNA AMP PROBE: CPT

## 2024-11-20 PROCEDURE — 83036 HEMOGLOBIN GLYCOSYLATED A1C: CPT

## 2024-11-20 PROCEDURE — 36415 COLL VENOUS BLD VENIPUNCTURE: CPT

## 2024-11-20 PROCEDURE — 86901 BLOOD TYPING SEROLOGIC RH(D): CPT

## 2024-11-20 PROCEDURE — 87641 MR-STAPH DNA AMP PROBE: CPT

## 2024-11-20 PROCEDURE — 93005 ELECTROCARDIOGRAM TRACING: CPT

## 2024-11-25 ENCOUNTER — APPOINTMENT (OUTPATIENT)
Dept: SURGERY | Facility: CLINIC | Age: 69
End: 2024-11-25
Payer: MEDICARE

## 2024-11-25 VITALS
DIASTOLIC BLOOD PRESSURE: 73 MMHG | HEART RATE: 75 BPM | WEIGHT: 303 LBS | HEIGHT: 71.5 IN | BODY MASS INDEX: 41.49 KG/M2 | TEMPERATURE: 97.9 F | RESPIRATION RATE: 14 BRPM | OXYGEN SATURATION: 99 % | SYSTOLIC BLOOD PRESSURE: 121 MMHG

## 2024-11-25 DIAGNOSIS — K40.90 UNILATERAL INGUINAL HERNIA, W/OUT OBSTRUCTION OR GANGRENE, NOT SPECIFIED AS RECURRENT: ICD-10-CM

## 2024-11-25 PROCEDURE — 99024 POSTOP FOLLOW-UP VISIT: CPT

## 2025-01-10 ENCOUNTER — APPOINTMENT (OUTPATIENT)
Dept: INTERNAL MEDICINE | Facility: CLINIC | Age: 70
End: 2025-01-10
Payer: MEDICARE

## 2025-01-10 VITALS
BODY MASS INDEX: 41.49 KG/M2 | SYSTOLIC BLOOD PRESSURE: 137 MMHG | WEIGHT: 303 LBS | HEART RATE: 79 BPM | DIASTOLIC BLOOD PRESSURE: 85 MMHG | HEIGHT: 71.5 IN

## 2025-01-10 DIAGNOSIS — E11.69 TYPE 2 DIABETES MELLITUS WITH OTHER SPECIFIED COMPLICATION: ICD-10-CM

## 2025-01-10 DIAGNOSIS — Z12.11 ENCOUNTER FOR SCREENING FOR MALIGNANT NEOPLASM OF COLON: ICD-10-CM

## 2025-01-10 DIAGNOSIS — I10 ESSENTIAL (PRIMARY) HYPERTENSION: ICD-10-CM

## 2025-01-10 DIAGNOSIS — E78.5 TYPE 2 DIABETES MELLITUS WITH OTHER SPECIFIED COMPLICATION: ICD-10-CM

## 2025-01-10 DIAGNOSIS — R53.83 OTHER FATIGUE: ICD-10-CM

## 2025-01-10 DIAGNOSIS — Z00.00 ENCOUNTER FOR GENERAL ADULT MEDICAL EXAMINATION W/OUT ABNORMAL FINDINGS: ICD-10-CM

## 2025-01-10 DIAGNOSIS — E11.59 TYPE 2 DIABETES MELLITUS WITH OTHER CIRCULATORY COMPLICATIONS: ICD-10-CM

## 2025-01-10 DIAGNOSIS — E66.01 MORBID (SEVERE) OBESITY DUE TO EXCESS CALORIES: ICD-10-CM

## 2025-01-10 DIAGNOSIS — N40.0 BENIGN PROSTATIC HYPERPLASIA WITHOUT LOWER URINARY TRACT SYMPMS: ICD-10-CM

## 2025-01-10 DIAGNOSIS — E88.9 TYPE 2 DIABETES MELLITUS WITH OTHER SPECIFIED COMPLICATION: ICD-10-CM

## 2025-01-10 DIAGNOSIS — I15.2 TYPE 2 DIABETES MELLITUS WITH OTHER CIRCULATORY COMPLICATIONS: ICD-10-CM

## 2025-01-10 PROCEDURE — G0439: CPT

## 2025-01-10 PROCEDURE — 36415 COLL VENOUS BLD VENIPUNCTURE: CPT

## 2025-01-14 ENCOUNTER — LABORATORY RESULT (OUTPATIENT)
Age: 70
End: 2025-01-14

## 2025-01-15 ENCOUNTER — APPOINTMENT (OUTPATIENT)
Dept: GASTROENTEROLOGY | Facility: CLINIC | Age: 70
End: 2025-01-15
Payer: MEDICARE

## 2025-01-15 VITALS
OXYGEN SATURATION: 98 % | RESPIRATION RATE: 16 BRPM | WEIGHT: 310 LBS | HEART RATE: 70 BPM | BODY MASS INDEX: 39.78 KG/M2 | SYSTOLIC BLOOD PRESSURE: 130 MMHG | HEIGHT: 74 IN | DIASTOLIC BLOOD PRESSURE: 70 MMHG

## 2025-01-15 DIAGNOSIS — R74.01 ELEVATION OF LEVELS OF LIVER TRANSAMINASE LEVELS: ICD-10-CM

## 2025-01-15 DIAGNOSIS — K76.0 FATTY (CHANGE OF) LIVER, NOT ELSEWHERE CLASSIFIED: ICD-10-CM

## 2025-01-15 LAB
A1AT SERPL-MCNC: 139 MG/DL
AFP-TM SERPL-MCNC: 3.8 NG/ML
ALBUMIN SERPL ELPH-MCNC: 4.4 G/DL
ALBUMIN SERPL ELPH-MCNC: 4.5 G/DL
ALP BLD-CCNC: 76 U/L
ALP BLD-CCNC: 77 U/L
ALT SERPL-CCNC: 124 U/L
ALT SERPL-CCNC: 124 U/L
ANION GAP SERPL CALC-SCNC: 11 MMOL/L
ANION GAP SERPL CALC-SCNC: 13 MMOL/L
AST SERPL-CCNC: 69 U/L
AST SERPL-CCNC: 70 U/L
BASOPHILS # BLD AUTO: 0.05 K/UL
BASOPHILS # BLD AUTO: 0.06 K/UL
BASOPHILS NFR BLD AUTO: 0.7 %
BASOPHILS NFR BLD AUTO: 0.9 %
BILIRUB INDIRECT SERPL-MCNC: 0.4 MG/DL
BILIRUB SERPL-MCNC: 0.6 MG/DL
BILIRUB SERPL-MCNC: 0.7 MG/DL
BUN SERPL-MCNC: 20 MG/DL
BUN SERPL-MCNC: 20 MG/DL
CALCIUM SERPL-MCNC: 10.1 MG/DL
CALCIUM SERPL-MCNC: 10.2 MG/DL
CERULOPLASMIN SERPL-MCNC: 23 MG/DL
CHLORIDE SERPL-SCNC: 103 MMOL/L
CHLORIDE SERPL-SCNC: 104 MMOL/L
CHOLEST SERPL-MCNC: 148 MG/DL
CO2 SERPL-SCNC: 26 MMOL/L
CO2 SERPL-SCNC: 27 MMOL/L
CREAT SERPL-MCNC: 1.24 MG/DL
CREAT SERPL-MCNC: 1.29 MG/DL
EGFR: 60 ML/MIN/1.73M2
EGFR: 63 ML/MIN/1.73M2
EOSINOPHIL # BLD AUTO: 0.2 K/UL
EOSINOPHIL # BLD AUTO: 0.21 K/UL
EOSINOPHIL NFR BLD AUTO: 2.9 %
EOSINOPHIL NFR BLD AUTO: 3.1 %
ESTIMATED AVERAGE GLUCOSE: 151 MG/DL
FERRITIN SERPL-MCNC: 424 NG/ML
GLUCOSE SERPL-MCNC: 107 MG/DL
GLUCOSE SERPL-MCNC: 117 MG/DL
HBA1C MFR BLD HPLC: 6.9 %
HBV CORE IGG+IGM SER QL: NONREACTIVE
HBV SURFACE AB SER QL: NONREACTIVE
HBV SURFACE AG SER QL: NONREACTIVE
HCT VFR BLD CALC: 52.5 %
HCT VFR BLD CALC: 52.6 %
HCV AB SER QL: NONREACTIVE
HCV S/CO RATIO: 0.09 S/CO
HDLC SERPL-MCNC: 39 MG/DL
HEPATITIS A IGG ANTIBODY: NONREACTIVE
HGB BLD-MCNC: 16.4 G/DL
HGB BLD-MCNC: 16.5 G/DL
IMM GRANULOCYTES NFR BLD AUTO: 0.9 %
IMM GRANULOCYTES NFR BLD AUTO: 1.2 %
INR PPP: 1.05 RATIO
IRON SATN MFR SERPL: 25 %
IRON SERPL-MCNC: 87 UG/DL
LDLC SERPL CALC-MCNC: 69 MG/DL
LYMPHOCYTES # BLD AUTO: 1.39 K/UL
LYMPHOCYTES # BLD AUTO: 1.43 K/UL
LYMPHOCYTES NFR BLD AUTO: 20.5 %
LYMPHOCYTES NFR BLD AUTO: 20.8 %
MAN DIFF?: NORMAL
MAN DIFF?: NORMAL
MCHC RBC-ENTMCNC: 28.8 PG
MCHC RBC-ENTMCNC: 28.9 PG
MCHC RBC-ENTMCNC: 31.2 G/DL
MCHC RBC-ENTMCNC: 31.4 G/DL
MCV RBC AUTO: 92.1 FL
MCV RBC AUTO: 92.3 FL
MITOCHONDRIA AB SER IF-ACNC: NORMAL
MONOCYTES # BLD AUTO: 0.59 K/UL
MONOCYTES # BLD AUTO: 0.68 K/UL
MONOCYTES NFR BLD AUTO: 8.7 %
MONOCYTES NFR BLD AUTO: 9.9 %
NEUTROPHILS # BLD AUTO: 4.44 K/UL
NEUTROPHILS # BLD AUTO: 4.48 K/UL
NEUTROPHILS NFR BLD AUTO: 64.3 %
NEUTROPHILS NFR BLD AUTO: 66.1 %
NONHDLC SERPL-MCNC: 110 MG/DL
NT-PROBNP SERPL-MCNC: <36 PG/ML
PLATELET # BLD AUTO: 193 K/UL
PLATELET # BLD AUTO: 216 K/UL
POTASSIUM SERPL-SCNC: 4.9 MMOL/L
POTASSIUM SERPL-SCNC: 5.3 MMOL/L
PROT SERPL-MCNC: 7.3 G/DL
PROT SERPL-MCNC: 7.3 G/DL
PSA SERPL-MCNC: 1.05 NG/ML
PT BLD: 12.5 SEC
RBC # BLD: 5.7 M/UL
RBC # BLD: 5.7 M/UL
RBC # FLD: 13.9 %
RBC # FLD: 14.1 %
SMOOTH MUSCLE AB SER QL IF: NORMAL
SODIUM SERPL-SCNC: 142 MMOL/L
SODIUM SERPL-SCNC: 142 MMOL/L
TIBC SERPL-MCNC: 353 UG/DL
TRIGL SERPL-MCNC: 251 MG/DL
TSH SERPL-ACNC: 1.16 UIU/ML
TSH SERPL-ACNC: 1.17 UIU/ML
UIBC SERPL-MCNC: 266 UG/DL
WBC # FLD AUTO: 6.78 K/UL
WBC # FLD AUTO: 6.89 K/UL

## 2025-01-15 PROCEDURE — 99214 OFFICE O/P EST MOD 30 MIN: CPT

## 2025-01-16 LAB
ANA SER IF-ACNC: NEGATIVE
IGG SER QL IEP: 1280 MG/DL
LKM AB SER QL IF: <20.1 UNITS

## 2025-01-20 ENCOUNTER — APPOINTMENT (OUTPATIENT)
Dept: INTERNAL MEDICINE | Facility: CLINIC | Age: 70
End: 2025-01-20
Payer: MEDICARE

## 2025-01-20 VITALS
BODY MASS INDEX: 39.78 KG/M2 | WEIGHT: 310 LBS | HEART RATE: 72 BPM | DIASTOLIC BLOOD PRESSURE: 72 MMHG | HEIGHT: 74 IN | SYSTOLIC BLOOD PRESSURE: 128 MMHG

## 2025-01-20 DIAGNOSIS — J18.9 PNEUMONIA, UNSPECIFIED ORGANISM: ICD-10-CM

## 2025-01-20 DIAGNOSIS — E11.69 TYPE 2 DIABETES MELLITUS WITH OTHER SPECIFIED COMPLICATION: ICD-10-CM

## 2025-01-20 DIAGNOSIS — E11.59 TYPE 2 DIABETES MELLITUS WITH OTHER CIRCULATORY COMPLICATIONS: ICD-10-CM

## 2025-01-20 DIAGNOSIS — I15.2 TYPE 2 DIABETES MELLITUS WITH OTHER CIRCULATORY COMPLICATIONS: ICD-10-CM

## 2025-01-20 DIAGNOSIS — E78.5 TYPE 2 DIABETES MELLITUS WITH OTHER SPECIFIED COMPLICATION: ICD-10-CM

## 2025-01-20 DIAGNOSIS — E66.01 MORBID (SEVERE) OBESITY DUE TO EXCESS CALORIES: ICD-10-CM

## 2025-01-20 PROCEDURE — G2211 COMPLEX E/M VISIT ADD ON: CPT

## 2025-01-20 PROCEDURE — 99214 OFFICE O/P EST MOD 30 MIN: CPT

## 2025-01-20 RX ORDER — ALBUTEROL SULFATE 90 UG/1
108 (90 BASE) INHALANT RESPIRATORY (INHALATION)
Qty: 1 | Refills: 1 | Status: ACTIVE | COMMUNITY
Start: 2025-01-20 | End: 1900-01-01

## 2025-01-20 RX ORDER — LEVOFLOXACIN 500 MG/1
500 TABLET, FILM COATED ORAL DAILY
Qty: 10 | Refills: 0 | Status: ACTIVE | COMMUNITY
Start: 2025-01-20 | End: 1900-01-01

## 2025-07-23 ENCOUNTER — APPOINTMENT (OUTPATIENT)
Dept: GASTROENTEROLOGY | Facility: CLINIC | Age: 70
End: 2025-07-23

## 2025-08-07 ENCOUNTER — APPOINTMENT (OUTPATIENT)
Dept: SURGERY | Facility: CLINIC | Age: 70
End: 2025-08-07
Payer: MEDICARE

## 2025-08-07 VITALS
BODY MASS INDEX: 38.5 KG/M2 | HEART RATE: 85 BPM | RESPIRATION RATE: 16 BRPM | DIASTOLIC BLOOD PRESSURE: 81 MMHG | TEMPERATURE: 97.8 F | WEIGHT: 300 LBS | HEIGHT: 74 IN | OXYGEN SATURATION: 96 % | SYSTOLIC BLOOD PRESSURE: 149 MMHG

## 2025-08-07 VITALS — SYSTOLIC BLOOD PRESSURE: 151 MMHG | DIASTOLIC BLOOD PRESSURE: 81 MMHG

## 2025-08-07 DIAGNOSIS — K40.90 UNILATERAL INGUINAL HERNIA, W/OUT OBSTRUCTION OR GANGRENE, NOT SPECIFIED AS RECURRENT: ICD-10-CM

## 2025-08-07 PROCEDURE — 99214 OFFICE O/P EST MOD 30 MIN: CPT

## 2025-09-04 ENCOUNTER — APPOINTMENT (OUTPATIENT)
Dept: SURGERY | Facility: CLINIC | Age: 70
End: 2025-09-04
Payer: MEDICARE

## 2025-09-04 VITALS
WEIGHT: 300 LBS | SYSTOLIC BLOOD PRESSURE: 151 MMHG | HEART RATE: 95 BPM | DIASTOLIC BLOOD PRESSURE: 82 MMHG | BODY MASS INDEX: 38.5 KG/M2 | TEMPERATURE: 97.7 F | RESPIRATION RATE: 16 BRPM | OXYGEN SATURATION: 96 % | HEIGHT: 74 IN

## 2025-09-04 DIAGNOSIS — R10.31 RIGHT LOWER QUADRANT PAIN: ICD-10-CM

## 2025-09-04 PROCEDURE — 99214 OFFICE O/P EST MOD 30 MIN: CPT

## (undated) DEVICE — XI CORD BIPOLAR CAUTERY (BLUE)

## (undated) DEVICE — SUT MONOCRYL 4-0 27" PS-2 UNDYED

## (undated) DEVICE — TROCAR COVIDIEN VERSAPORT BLADELESS OPTICAL 5MM STANDARD

## (undated) DEVICE — SOL IRR POUR NS 0.9% 1000ML

## (undated) DEVICE — XI TIP COVER

## (undated) DEVICE — INSUFFLATION NDL COVIDIEN SURGINEEDLE VERESS 120MM

## (undated) DEVICE — XI ARM SCISSOR MONO CURVED

## (undated) DEVICE — XI DRAPE ARM

## (undated) DEVICE — XI SEAL UNIVERSIAL 5-12MM

## (undated) DEVICE — XI ARM FORCEP PROGRASP 8MM

## (undated) DEVICE — XI CORD MONOPOLAR CAUTERY (GREEN)

## (undated) DEVICE — STAPLER SKIN PROXIMATE

## (undated) DEVICE — XI OBTURATOR OPTICAL BLADELESS 8MM

## (undated) DEVICE — SOL IRR POUR H2O 1000ML

## (undated) DEVICE — PACK ROBOTIC

## (undated) DEVICE — ELCTR GROUNDING PAD ADULT COVIDIEN

## (undated) DEVICE — XI ARM NEEDLE DRIVER SUTURECUT MEGA 8MM

## (undated) DEVICE — DRAPE TOWEL BLUE STICKY

## (undated) DEVICE — DRAPE XL SHEET 77X98"

## (undated) DEVICE — SUT VICRYL 0 27" UR-6

## (undated) DEVICE — DRAPE GENERAL ENDOSCOPY

## (undated) DEVICE — SUT VLOC 180 3-0 6" V-20 GREEN

## (undated) DEVICE — XI DRAPE COLUMN

## (undated) DEVICE — D HELP - CLEARVIEW CLEARIFY SYSTEM

## (undated) DEVICE — ELCTR BOVIE PENCIL SMOKE EVACUATION 15FT

## (undated) DEVICE — GOWN ROYAL SILK XL

## (undated) DEVICE — VENODYNE/SCD SLEEVE CALF MEDIUM